# Patient Record
Sex: FEMALE | Race: BLACK OR AFRICAN AMERICAN | URBAN - METROPOLITAN AREA
[De-identification: names, ages, dates, MRNs, and addresses within clinical notes are randomized per-mention and may not be internally consistent; named-entity substitution may affect disease eponyms.]

---

## 2017-01-18 ENCOUNTER — OFFICE VISIT (OUTPATIENT)
Dept: INTERNAL MEDICINE CLINIC | Age: 52
End: 2017-01-18

## 2017-01-18 VITALS
TEMPERATURE: 99.1 F | HEIGHT: 64 IN | WEIGHT: 248 LBS | BODY MASS INDEX: 42.34 KG/M2 | OXYGEN SATURATION: 100 % | RESPIRATION RATE: 20 BRPM | SYSTOLIC BLOOD PRESSURE: 139 MMHG | DIASTOLIC BLOOD PRESSURE: 74 MMHG | HEART RATE: 86 BPM

## 2017-01-18 DIAGNOSIS — E66.01 MORBID OBESITY WITH BMI OF 40.0-44.9, ADULT (HCC): ICD-10-CM

## 2017-01-18 DIAGNOSIS — K29.00 ACUTE GASTRITIS WITHOUT HEMORRHAGE, UNSPECIFIED GASTRITIS TYPE: ICD-10-CM

## 2017-01-18 DIAGNOSIS — M54.12 CERVICAL RADICULOPATHY: ICD-10-CM

## 2017-01-18 DIAGNOSIS — E11.9 CONTROLLED TYPE 2 DIABETES MELLITUS WITHOUT COMPLICATION, WITHOUT LONG-TERM CURRENT USE OF INSULIN (HCC): Primary | ICD-10-CM

## 2017-01-18 RX ORDER — OMEPRAZOLE 40 MG/1
40 CAPSULE, DELAYED RELEASE ORAL DAILY
Qty: 30 CAP | Refills: 4 | Status: SHIPPED | OUTPATIENT
Start: 2017-01-18 | End: 2017-06-26 | Stop reason: SDUPTHER

## 2017-01-18 NOTE — MR AVS SNAPSHOT
Visit Information Date & Time Provider Department Dept. Phone Encounter #  
 1/18/2017 10:30 AM MD JOVITA Alcantara AND PRIMARY CARE - [place] Donato [phone] [id number] Follow-up Instructions Return in about 4 months (around 5/18/2017). Follow-up and Disposition History Your Appointments 5/17/2017 10:30 AM  
Any with MD JOVITA Alcantara AND PRIMARY CARE - [place] Donato ([facility]) Appt Note: 4 month f/u  
 [address], [address] [facility] 98  
  
   
 [address], [place] [address] [place] [address] Upcoming Health Maintenance Date Due  
 EYE EXAM RETINAL OR DILATED Q1 1/30/1973 HEMOGLOBIN A1C Q6M 4/12/2017 LIPID PANEL Q1 5/4/2017 FOBT Q 1 YEAR AGE 50-75 5/4/2017 FOOT EXAM Q1 1/18/2018 MICROALBUMIN Q1 1/18/2018 BREAST CANCER SCRN MAMMOGRAM 5/4/2018 PAP AKA CERVICAL CYTOLOGY 5/4/2019 DTaP/Tdap/Td series (2 - Td) 6/1/2026 Allergies as of 1/18/2017  Review Complete On: 1/18/2017 By: Netta Dave MD  
  
 Severity Noted Reaction Type Reactions Aspirin  05/04/2016    Other (comments) Bronchospasm Current Immunizations  Never Reviewed No immunizations on file. Not reviewed this visit You Were Diagnosed With   
  
 Codes Comments Controlled type 2 diabetes mellitus without complication, without long-term current use of insulin ([facility]    -  Primary ICD-10-CM: E11.9 ICD-9-CM: 250.00 Acute gastritis without hemorrhage, unspecified gastritis type     ICD-10-CM: K29.00 ICD-9-CM: 535.00 Morbid obesity with BMI of 40.0-44.9, adult (HCC)     ICD-10-CM: E66.01, Z68.41 
ICD-9-CM: 278.01, V85.41 Cervical radiculopathy     ICD-10-CM: M54.12 
ICD-9-CM: 723.4 Vitals BP Pulse Temp Resp Height(growth percentile) Weight(growth percentile)  139/74 (BP 1 Location: Right arm, BP Patient Position: Sitting) 86 99.1 °F (37.3 °C) (Oral) 20 5' 4\" (1.626 m) 248 lb (112.5 kg) SpO2 BMI Smoking Status 100% 42.57 kg/m2 Never Smoker BMI and BSA Data Body Mass Index Body Surface Area 42.57 kg/m 2 2.25 m 2 Preferred Pharmacy Pharmacy Name Phone [facility] #[id number]- [place], [address] Jessica Ohara [phone] Your Updated Medication List  
  
   
This list is accurate as of: 1/18/17 11:37 AM.  Always use your most recent med list.  
  
  
  
  
 ADVAIR DISKUS 100-50 mcg/dose diskus inhaler Generic drug:  fluticasone-salmeterol Take 1 Puff by inhalation two (2) times a day. naproxen 500 mg tablet Commonly known as:  NAPROSYN Take 1 Tab by mouth two (2) times daily (with meals). omeprazole 40 mg capsule Commonly known as:  PRILOSEC Take 1 Cap by mouth daily. PROVENTIL HFA 90 mcg/actuation inhaler Generic drug:  albuterol Take 2 Puffs by inhalation every four (4) hours as needed for Wheezing. valACYclovir 1 gram tablet Commonly known as:  VALTREX Prescriptions Sent to Pharmacy Refills  
 omeprazole (PRILOSEC) 40 mg capsule 4 Sig: Take 1 Cap by mouth daily. Class: Normal  
 Pharmacy: [facility]/pharmacy [address] [place] [address] #: [phone] Route: Oral  
  
We Performed the Following HEMOGLOBIN A1C WITH EAG [86847 CPT(R)] Follow-up Instructions Return in about 4 months (around 5/18/2017). Introducing [facility] & HEALTH SERVICES! Dear Dominic: Thank you for requesting a Eyetronics account. Our records indicate that you have previously registered for a Eyetronics account but its currently inactive. Please call our Eyetronics support line at [phone]. Additional Information If you have questions, please visit the Frequently Asked Questions section of the Eyetronics website at [email or web address] Remember, Orange Leapt is NOT to be used for urgent needs.  For medical emergencies, dial 911. Now available from your iPhone and Android! Please provide this summary of care documentation to your next provider. If you have any questions after today's visit, please call [phone].

## 2017-01-18 NOTE — PROGRESS NOTES
Chief Complaint   Patient presents with    Physical   .      SUBECTIVE:    Tyler Gomez is a 46 y.o. female comes in for return visit stating that she has had epigastric sensation that occurs episodically. There is no association with eating. It lasts for about five to ten minutes and abates. She realizes she is a diabetic based on my previous comments. Her last hemoglobin A1C was 6.4, a significant reduction from the initial one of 6.7. Her weight continues to decrease and I congratulate her on this. She is no longer having any pain in her cervical region. Current Outpatient Prescriptions   Medication Sig Dispense Refill    omeprazole (PRILOSEC) 40 mg capsule Take 1 Cap by mouth daily. 30 Cap 4    ADVAIR DISKUS 100-50 mcg/dose diskus inhaler Take 1 Puff by inhalation two (2) times a day. 1 Inhaler 11    PROVENTIL HFA 90 mcg/actuation inhaler Take 2 Puffs by inhalation every four (4) hours as needed for Wheezing. 1 Inhaler 11    valACYclovir (VALTREX) 1 gram tablet   11    naproxen (NAPROSYN) 500 mg tablet Take 1 Tab by mouth two (2) times daily (with meals).  61 Tab 11     Past Medical History   Diagnosis Date    Arthritis     Asthma      Past Surgical History   Procedure Laterality Date    Hx [de-identified]      Hx gyn       s/p myomectomy, HAY and BSO    Hx cholecystectomy      Hx colonoscopy  4-     tubulovillous adenoma---Dr.Christine Small     Allergies   Allergen Reactions    Aspirin Other (comments)     Bronchospasm       REVIEW OF SYSTEMS:  Review of Systems - Negative except   ENT ROS: negative for - headaches, hearing change, nasal congestion, oral lesions, tinnitus, visual changes or   Respiratory ROS: no cough, shortness of breath, or wheezing  Cardiovascular ROS: no chest pain or dyspnea on exertion  Gastrointestinal ROS: no abdominal pain, change in bowel habits, or black or blood  Genito-Urinary ROS: no dysuria, trouble voiding, or hematuria  Musculoskeletal ROS: negative  Neurological ROS: no TIA or stroke symptoms      Social History     Social History    Marital status: LEGALLY      Spouse name: N/A    Number of children: 0    Years of education: N/A     Occupational History    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      Social History Main Topics    Smoking status: Never Smoker    Smokeless tobacco: Never Used    Alcohol use No    Drug use: No    Sexual activity: Not Asked     Other Topics Concern    None     Social History Narrative   r  Family History   Problem Relation Age of Onset    Cancer Mother     Cancer Father      Prostate cancer    Diabetes Sister        OBJECTIVE:  Visit Vitals    /74 (BP 1 Location: Right arm, BP Patient Position: Sitting)    Pulse 86    Temp 99.1 °F (37.3 °C) (Oral)    Resp 20    Ht 5' 4\" (1.626 m)    Wt 248 lb (112.5 kg)    SpO2 100%    BMI 42.57 kg/m2     ENT: perrla,  eom intact  NECK: supple. Thyroid normal, no JVD  CHEST: clear to ascultation and percussion   HEART: regular rate and rhythm  ABD: soft, bowel sounds active,   EXTREMITIES: no edema, pulse 1+  INTEGUMENT: clear      ASSESSMENT:  1. Controlled type 2 diabetes mellitus without complication, without long-term current use of insulin ([address]    2. Acute gastritis without hemorrhage, unspecified gastritis type    3. Morbid obesity with BMI of 40.0-44.9, adult (HCC)    4. Cervical radiculopathy        PLAN:    1. From a diabetic perspective a hemoglobin A1C will be checked. I remind her that the most important thing that she can do from this perspective is to lose weight. 2. I suspect she might have an element of gastritis. She was quite tender in the epigastric region today. I will empirically start her on Omeprazole 40 mg every day . 3. I encourage her to lose weight by reducing carbohydrate intake. This means avoiding sweets, white bread, and white potatoes.    Her biggest problem is snacks. We talk about the implication of snacking and what she has to restrict. 4. Her cervical radiculopathy has completely resolved. .  Orders Placed This Encounter    HEMOGLOBIN A1C WITH EAG    omeprazole (PRILOSEC) 40 mg capsule       Follow-up Disposition:  Return in about 4 months (around 5/18/2017).       Konrad Trevino MD

## 2017-01-19 LAB
EST. AVERAGE GLUCOSE BLD GHB EST-MCNC: 134 MG/DL
HBA1C MFR BLD: 6.3 % (ref 4.8–5.6)

## 2017-05-17 ENCOUNTER — OFFICE VISIT (OUTPATIENT)
Dept: INTERNAL MEDICINE CLINIC | Age: 52
End: 2017-05-17

## 2017-05-17 VITALS
HEART RATE: 70 BPM | HEIGHT: 64 IN | SYSTOLIC BLOOD PRESSURE: 142 MMHG | TEMPERATURE: 97.4 F | OXYGEN SATURATION: 100 % | DIASTOLIC BLOOD PRESSURE: 70 MMHG | RESPIRATION RATE: 18 BRPM | WEIGHT: 252.6 LBS | BODY MASS INDEX: 43.13 KG/M2

## 2017-05-17 DIAGNOSIS — Z00.00 PHYSICAL EXAM: ICD-10-CM

## 2017-05-17 DIAGNOSIS — J45.20 REACTIVE AIRWAY DISEASE, MILD INTERMITTENT, UNCOMPLICATED: ICD-10-CM

## 2017-05-17 DIAGNOSIS — E11.9 CONTROLLED TYPE 2 DIABETES MELLITUS WITHOUT COMPLICATION, WITHOUT LONG-TERM CURRENT USE OF INSULIN (HCC): Primary | ICD-10-CM

## 2017-05-17 DIAGNOSIS — E66.01 MORBID OBESITY WITH BMI OF 40.0-44.9, ADULT (HCC): ICD-10-CM

## 2017-05-17 DIAGNOSIS — S93.401A SPRAIN OF RIGHT ANKLE, UNSPECIFIED LIGAMENT, INITIAL ENCOUNTER: ICD-10-CM

## 2017-05-17 PROBLEM — J45.909 REACTIVE AIRWAY DISEASE: Status: ACTIVE | Noted: 2017-05-17

## 2017-05-17 NOTE — PROGRESS NOTES
[address] [place] [address] and Primary Care  [place] [address]  [address] [place] [address]  Phone:  [phone]  Fax: [phone]       Chief Complaint   Patient presents with    Follow-up     4 month visit   . SUBJECTIVE:    Nereida Martinez is a 45 y.o. female  Dictation on: 05/17/2017  1:02 PM by: Torito Aleman [[id number]]          Current Outpatient Prescriptions   Medication Sig Dispense Refill    omeprazole (PRILOSEC) 40 mg capsule Take 1 Cap by mouth daily. 30 Cap 4    ADVAIR DISKUS 100-50 mcg/dose diskus inhaler Take 1 Puff by inhalation two (2) times a day. 1 Inhaler 11    PROVENTIL HFA 90 mcg/actuation inhaler Take 2 Puffs by inhalation every four (4) hours as needed for Wheezing. 1 Inhaler 11    valACYclovir (VALTREX) 1 gram tablet   11    naproxen (NAPROSYN) 500 mg tablet Take 1 Tab by mouth two (2) times daily (with meals).  61 Tab 11     Past Medical History:   Diagnosis Date    Arthritis     Asthma      Past Surgical History:   Procedure Laterality Date    HX CHOLECYSTECTOMY      HX COLONOSCOPY  4-    tubulovillous adenoma---Dr.Christine Small    HX GI      HX GYN      s/p myomectomy, HAY and BSO     Allergies   Allergen Reactions    Aspirin Other (comments)     Bronchospasm         REVIEW OF SYSTEMS:  General: negative for - chills or fever  ENT: negative for - headaches, nasal congestion or tinnitus  Respiratory: negative for - cough, hemoptysis, shortness of breath or wheezing  Cardiovascular : negative for - chest pain, edema, palpitations or shortness of breath  Gastrointestinal: negative for - abdominal pain, blood in stools, heartburn or nausea/vomiting  Genito-Urinary: no dysuria, trouble voiding, or hematuria  Musculoskeletal: negative for - gait disturbance, joint pain, joint stiffness or joint swelling  Neurological: no TIA or stroke symptoms  Hematologic: no bruises, no bleeding, no swollen glands  Integument: no lumps, mole changes, nail changes or rash  Endocrine: no malaise/lethargy or unexpected weight changes      Social History     Social History    Marital status: LEGALLY      Spouse name: N/A    Number of children: 0    Years of education: N/A     Occupational History    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      Social History Main Topics    Smoking status: Never Smoker    Smokeless tobacco: Never Used    Alcohol use No    Drug use: No    Sexual activity: Not Asked     Other Topics Concern    None     Social History Narrative     Family History   Problem Relation Age of Onset    Cancer Mother     Cancer Father      Prostate cancer    Diabetes Sister        OBJECTIVE:    Visit Vitals    /70 (BP 1 Location: Left arm, BP Patient Position: Sitting)    Pulse 70    Temp 97.4 °F (36.3 °C) (Oral)    Resp 18    Ht 5' 4\" (1.626 m)    Wt 252 lb 9.6 oz (114.6 kg)    SpO2 100%    BMI 43.36 kg/m2     CONSTITUTIONAL: well , well nourished, appears age appropriate  EYES: perrla, eom intact  ENMT:moist mucous membranes, pharynx clear  NECK: supple. Thyroid normal  RESPIRATORY: Chest: clear to ascultation and percussion   CARDIOVASCULAR: Heart: regular rate and rhythm  GASTROINTESTINAL: Abdomen: soft, bowel sounds active  HEMATOLOGIC: no pathological lymph nodes palpated  MUSCULOSKELETAL: Extremities: no edema, pulse 1+   INTEGUMENT: No unusual rashes or suspicious skin lesions noted. Nails appear normal.  NEUROLOGIC: non-focal exam   MENTAL STATUS: alert and oriented, appropriate affect      ASSESSMENT:  1. Controlled type 2 diabetes mellitus without complication, without long-term current use of insulin ([address]    2. Morbid obesity with BMI of 40.0-44.9, adult ([address]    3. Sprain of right ankle, unspecified ligament, initial encounter    4. Reactive airway disease, mild intermittent, uncomplicated    5. Physical exam        PLAN:    1. The patient's diabetes is hopefully doing well.   Emphasis is again placed on weight loss. 2. We talk as I stated earlier on ways to lose weight with emphasis placed on eating meals and minimizing snacking. Coupled with this was carbohydrate restriction by avoiding sweets, white bread, and white potatoes. .   3. She has a very mild sprain of her right ankle. Nothing more need be done. 4. Her reactive airway disease is stable today. .  Orders Placed This Encounter    APOLIPOPROTEIN B    CBC WITH AUTOMATED DIFF    LIPID PANEL    URINALYSIS W/ RFLX MICROSCOPIC    TSH 3RD GENERATION    METABOLIC PANEL, COMPREHENSIVE    HEMOGLOBIN A1C WITH EAG    MICROSCOPIC EXAMINATION         Follow-up Disposition:  Return in about 4 months (around 9/17/2017).       Sydnie Leary MD

## 2017-05-17 NOTE — MR AVS SNAPSHOT
Visit Information Date & Time Provider Department Dept. Phone Encounter #  
 5/17/2017 10:30 AM Sharyn Huerta MD SPORTS MED AND PRIMARY CARE - Maryln Osler [phone] [id number] Follow-up Instructions Return in about 4 months (around 9/17/2017). Your Appointments 9/20/2017 10:15 AM  
Any with Sharyn Huerta MD  
59 [facility] ([address]) Appt Note: 4 month f/u  
 [address] [place] [address] [place] [address]  
  
   
 [address] [facility] Upcoming Health Maintenance Date Due  
 EYE EXAM RETINAL OR DILATED Q1 1/30/1973 LIPID PANEL Q1 5/4/2017 FOBT Q 1 YEAR AGE 50-75 5/4/2017 HEMOGLOBIN A1C Q6M 7/18/2017 INFLUENZA AGE 9 TO ADULT 8/1/2017 FOOT EXAM Q1 1/18/2018 MICROALBUMIN Q1 1/18/2018 BREAST CANCER SCRN MAMMOGRAM 5/4/2018 PAP AKA CERVICAL CYTOLOGY 5/4/2019 DTaP/Tdap/Td series (2 - Td) 6/1/2026 Allergies as of 5/17/2017  Review Complete On: 5/17/2017 By: [place] Severity Noted Reaction Type Reactions Aspirin  05/04/2016    Other (comments) Bronchospasm Current Immunizations  Never Reviewed No immunizations on file. Not reviewed this visit You Were Diagnosed With   
  
 Codes Comments Controlled type 2 diabetes mellitus without complication, without long-term current use of insulin ([facility]    -  Primary ICD-10-CM: E11.9 ICD-9-CM: 250.00 Morbid obesity with BMI of 40.0-44.9, adult (HCC)     ICD-10-CM: E66.01, Z68.41 
ICD-9-CM: 278.01, V85.41 Physical exam     ICD-10-CM: Z00.00 ICD-9-CM: V70.9 Vitals BP Pulse Temp Resp Height(growth percentile) Weight(growth percentile) 142/70 (BP 1 Location: Left arm, BP Patient Position: Sitting) 70 97.4 °F (36.3 °C) (Oral) 18 5' 4\" (1.626 m) 252 lb 9.6 oz (114.6 kg) SpO2 BMI OB Status Smoking Status 100% 43.36 kg/m2 Menopause Never Smoker BMI and BSA Data Body Mass Index Body Surface Area  
 43.36 kg/m 2 2.27 m 2 Preferred Pharmacy Pharmacy Name Phone [id number]- [place], [address] Sandra Lick [phone] Your Updated Medication List  
  
   
This list is accurate as of: 5/17/17 12:57 PM.  Always use your most recent med list.  
  
  
  
  
 ADVAIR DISKUS 100-50 mcg/dose diskus inhaler Generic drug:  fluticasone-salmeterol Take 1 Puff by inhalation two (2) times a day. naproxen 500 mg tablet Commonly known as:  NAPROSYN Take 1 Tab by mouth two (2) times daily (with meals). omeprazole 40 mg capsule Commonly known as:  PRILOSEC Take 1 Cap by mouth daily. PROVENTIL HFA 90 mcg/actuation inhaler Generic drug:  albuterol Take 2 Puffs by inhalation every four (4) hours as needed for Wheezing. valACYclovir 1 gram tablet Commonly known as:  VALTREX We Performed the Following APOLIPOPROTEIN B [id number] CPT(R)] CBC WITH AUTOMATED DIFF [14361 CPT(R)] HEMOGLOBIN A1C WITH EAG [63736 CPT(R)] LIPID PANEL [25059 CPT(R)] METABOLIC PANEL, COMPREHENSIVE [24814 CPT(R)] [place] COLLECTION VENOUS BLOOD,VENIPUNCTURE [id number] CPT(R)] TSH 3RD GENERATION [41056 CPT(R)] URINALYSIS W/ RFLX MICROSCOPIC [44816 CPT(R)] Follow-up Instructions Return in about 4 months (around 9/17/2017). Introducing [facility] & HEALTH SERVICES! Dear Angie Stevens: Thank you for requesting a Dydra account. Our records indicate that you have previously registered for a Dydra account but its currently inactive. Please call our Dydra support line at [phone]. Additional Information If you have questions, please visit the Frequently Asked Questions section of the Dydra website at [email or web address] Remember, Dydra is NOT to be used for urgent needs. For medical emergencies, dial 911. Now available from your iPhone and Android! Please provide this summary of care documentation to your next provider. If you have any questions after today's visit, please call [phone].

## 2017-05-17 NOTE — PROGRESS NOTES
1. Have you been to the ER, urgent care clinic since your last visit? Hospitalized since your last visit? No    2. Have you seen or consulted any other health care providers outside of the [address] [place] [address] since your last visit? Include any pap smears or colon screening.  No

## 2017-05-18 LAB
ALBUMIN SERPL-MCNC: 4.5 G/DL (ref 3.5–5.5)
ALBUMIN/GLOB SERPL: 1.9 {RATIO} (ref 1.2–2.2)
ALP SERPL-CCNC: 69 IU/L (ref 39–117)
ALT SERPL-CCNC: 13 IU/L (ref 0–32)
APO B SERPL-MCNC: 85 MG/DL (ref 54–133)
APPEARANCE UR: CLEAR
AST SERPL-CCNC: 16 IU/L (ref 0–40)
BACTERIA #/AREA URNS HPF: NORMAL /[HPF]
BASOPHILS # BLD AUTO: 0 X10E3/UL (ref 0–0.2)
BASOPHILS NFR BLD AUTO: 0 %
BILIRUB SERPL-MCNC: 0.3 MG/DL (ref 0–1.2)
BILIRUB UR QL STRIP: NEGATIVE
BUN SERPL-MCNC: 10 MG/DL (ref 6–24)
BUN/CREAT SERPL: 13 (ref 9–23)
CALCIUM SERPL-MCNC: 10.5 MG/DL (ref 8.7–10.2)
CASTS URNS QL MICRO: NORMAL /LPF
CHLORIDE SERPL-SCNC: 100 MMOL/L (ref 96–106)
CHOLEST SERPL-MCNC: 181 MG/DL (ref 100–199)
CO2 SERPL-SCNC: 25 MMOL/L (ref 18–29)
COLOR UR: YELLOW
CREAT SERPL-MCNC: 0.79 MG/DL (ref 0.57–1)
EOSINOPHIL # BLD AUTO: 0.2 X10E3/UL (ref 0–0.4)
EOSINOPHIL NFR BLD AUTO: 4 %
EPI CELLS #/AREA URNS HPF: NORMAL /HPF
ERYTHROCYTE [DISTWIDTH] IN BLOOD BY AUTOMATED COUNT: 14.4 % (ref 12.3–15.4)
EST. AVERAGE GLUCOSE BLD GHB EST-MCNC: 134 MG/DL
GLOBULIN SER CALC-MCNC: 2.4 G/DL (ref 1.5–4.5)
GLUCOSE SERPL-MCNC: 94 MG/DL (ref 65–99)
GLUCOSE UR QL: NEGATIVE
HBA1C MFR BLD: 6.3 % (ref 4.8–5.6)
HCT VFR BLD AUTO: 38.1 % (ref 34–46.6)
HDLC SERPL-MCNC: 70 MG/DL
HGB BLD-MCNC: 12.2 G/DL (ref 11.1–15.9)
HGB UR QL STRIP: NEGATIVE
IMM GRANULOCYTES # BLD: 0 X10E3/UL (ref 0–0.1)
IMM GRANULOCYTES NFR BLD: 0 %
KETONES UR QL STRIP: NEGATIVE
LDLC SERPL CALC-MCNC: 99 MG/DL (ref 0–99)
LEUKOCYTE ESTERASE UR QL STRIP: ABNORMAL
LYMPHOCYTES # BLD AUTO: 2.7 X10E3/UL (ref 0.7–3.1)
LYMPHOCYTES NFR BLD AUTO: 48 %
MCH RBC QN AUTO: 26.5 PG (ref 26.6–33)
MCHC RBC AUTO-ENTMCNC: 32 G/DL (ref 31.5–35.7)
MCV RBC AUTO: 83 FL (ref 79–97)
MICRO URNS: ABNORMAL
MONOCYTES # BLD AUTO: 0.4 X10E3/UL (ref 0.1–0.9)
MONOCYTES NFR BLD AUTO: 7 %
NEUTROPHILS # BLD AUTO: 2.4 X10E3/UL (ref 1.4–7)
NEUTROPHILS NFR BLD AUTO: 41 %
NITRITE UR QL STRIP: NEGATIVE
PH UR STRIP: 8 [PH] (ref 5–7.5)
PLATELET # BLD AUTO: 256 X10E3/UL (ref 150–379)
POTASSIUM SERPL-SCNC: 4.5 MMOL/L (ref 3.5–5.2)
PROT SERPL-MCNC: 6.9 G/DL (ref 6–8.5)
PROT UR QL STRIP: NEGATIVE
RBC # BLD AUTO: 4.61 X10E6/UL (ref 3.77–5.28)
RBC #/AREA URNS HPF: NORMAL /HPF
SODIUM SERPL-SCNC: 140 MMOL/L (ref 134–144)
SP GR UR: 1.01 (ref 1–1.03)
TRIGL SERPL-MCNC: 59 MG/DL (ref 0–149)
TSH SERPL DL<=0.005 MIU/L-ACNC: 1.42 UIU/ML (ref 0.45–4.5)
UROBILINOGEN UR STRIP-MCNC: 0.2 MG/DL (ref 0.2–1)
VLDLC SERPL CALC-MCNC: 12 MG/DL (ref 5–40)
WBC # BLD AUTO: 5.8 X10E3/UL (ref 3.4–10.8)
WBC #/AREA URNS HPF: NORMAL /HPF

## 2017-10-02 ENCOUNTER — OFFICE VISIT (OUTPATIENT)
Dept: INTERNAL MEDICINE CLINIC | Age: 52
End: 2017-10-02

## 2017-10-02 VITALS
DIASTOLIC BLOOD PRESSURE: 85 MMHG | RESPIRATION RATE: 18 BRPM | HEIGHT: 64 IN | OXYGEN SATURATION: 100 % | HEART RATE: 72 BPM | TEMPERATURE: 97.9 F | BODY MASS INDEX: 43.19 KG/M2 | WEIGHT: 253 LBS | SYSTOLIC BLOOD PRESSURE: 154 MMHG

## 2017-10-02 DIAGNOSIS — E11.9 CONTROLLED TYPE 2 DIABETES MELLITUS WITHOUT COMPLICATION, WITHOUT LONG-TERM CURRENT USE OF INSULIN (HCC): Primary | ICD-10-CM

## 2017-10-02 DIAGNOSIS — J45.909 REACTIVE AIRWAY DISEASE WITHOUT COMPLICATION, UNSPECIFIED ASTHMA SEVERITY, UNSPECIFIED WHETHER PERSISTENT: ICD-10-CM

## 2017-10-02 DIAGNOSIS — A60.00 GENITAL HERPES SIMPLEX, UNSPECIFIED SITE: ICD-10-CM

## 2017-10-02 DIAGNOSIS — E66.01 MORBID OBESITY WITH BMI OF 40.0-44.9, ADULT (HCC): ICD-10-CM

## 2017-10-02 RX ORDER — VALACYCLOVIR HYDROCHLORIDE 1 G/1
500 TABLET, FILM COATED ORAL DAILY
Qty: 30 TAB | Refills: 11 | Status: SHIPPED | OUTPATIENT
Start: 2017-10-02 | End: 2017-10-06 | Stop reason: SDUPTHER

## 2017-10-02 RX ORDER — NAPROXEN 500 MG/1
500 TABLET ORAL 2 TIMES DAILY WITH MEALS
Qty: 60 TAB | Refills: 11 | Status: SHIPPED | OUTPATIENT
Start: 2017-10-02 | End: 2020-07-21 | Stop reason: SDUPTHER

## 2017-10-02 RX ORDER — ALBUTEROL SULFATE 90 UG/1
2 AEROSOL, METERED RESPIRATORY (INHALATION)
Qty: 1 INHALER | Refills: 11 | Status: SHIPPED | OUTPATIENT
Start: 2017-10-02 | End: 2019-01-14 | Stop reason: SDUPTHER

## 2017-10-02 NOTE — MR AVS SNAPSHOT
Visit Information Date & Time Provider Department Dept. Phone Encounter #  
 10/2/2017  3:45 PM Konrad Trevino MD SPORTS MED AND PRIMARY CARE - [place] [phone] [id number] Follow-up Instructions Return in about 4 months (around 2/2/2018). Upcoming Health Maintenance Date Due  
 EYE EXAM RETINAL OR DILATED Q1 1/30/1973 FOBT Q 1 YEAR AGE 50-75 5/4/2017 INFLUENZA AGE 9 TO ADULT 8/1/2017 HEMOGLOBIN A1C Q6M 11/17/2017 FOOT EXAM Q1 1/18/2018 MICROALBUMIN Q1 1/18/2018 BREAST CANCER SCRN MAMMOGRAM 5/4/2018 LIPID PANEL Q1 5/17/2018 PAP AKA CERVICAL CYTOLOGY 5/4/2019 DTaP/Tdap/Td series (2 - Td) 6/1/2026 Allergies as of 10/2/2017  Review Complete On: 10/2/2017 By: Onita Mess Severity Noted Reaction Type Reactions Aspirin  05/04/2016    Other (comments) Bronchospasm Current Immunizations  Never Reviewed No immunizations on file. Not reviewed this visit You Were Diagnosed With   
  
 Codes Comments Controlled type 2 diabetes mellitus without complication, without long-term current use of insulin ([facility]    -  Primary ICD-10-CM: E11.9 ICD-9-CM: 250.00 Morbid obesity with BMI of 40.0-44.9, adult (HCC)     ICD-10-CM: E66.01, Z68.41 
ICD-9-CM: 278.01, V85.41 Reactive airway disease without complication, unspecified asthma severity, unspecified whether persistent     ICD-10-CM: J45.909 ICD-9-CM: 493.90 Genital herpes simplex, unspecified site     ICD-10-CM: A60.00 ICD-9-CM: 054.10 Vitals BP Pulse Temp Resp Height(growth percentile) Weight(growth percentile) 154/85 (BP 1 Location: Left arm, BP Patient Position: Sitting) 72 97.9 °F (36.6 °C) (Oral) 18 5' 4\" (1.626 m) 253 lb (114.8 kg) SpO2 BMI OB Status Smoking Status 100% 43.43 kg/m2 Menopause Never Smoker BMI and BSA Data Body Mass Index Body Surface Area  
 43.43 kg/m 2 2.28 m 2 Preferred Pharmacy Pharmacy Name Phone [facility][id number]- [place], [address] Janeen Burton [phone] Your Updated Medication List  
  
   
This list is accurate as of: 10/2/17  5:19 PM.  Always use your most recent med list.  
  
  
  
  
 ADVAIR DISKUS 100-50 mcg/dose diskus inhaler Generic drug:  fluticasone-salmeterol TAKE 1 PUFF BY INHALATION TWO (2) TIMES A DAY. albuterol 90 mcg/actuation inhaler Commonly known as:  PROVENTIL HFA, VENTOLIN HFA, PROAIR HFA Take 2 Puffs by inhalation every four (4) hours as needed for Wheezing. naproxen 500 mg tablet Commonly known as:  NAPROSYN Take 1 Tab by mouth two (2) times daily (with meals). omeprazole 40 mg capsule Commonly known as:  PRILOSEC Take 1 Cap by mouth daily. valACYclovir 1 gram tablet Commonly known as:  VALTREX Take 0.5 Tabs by mouth daily. Prescriptions Sent to Pharmacy Refills  
 naproxen (NAPROSYN) 500 mg tablet 11 Sig: Take 1 Tab by mouth two (2) times daily (with meals). Class: Normal  
 Pharmacy: [address] Ph #: [phone] Route: Oral  
 valACYclovir (VALTREX) 1 gram tablet 11 Sig: Take 0.5 Tabs by mouth daily. Class: Normal  
 Pharmacy: [address] Ph #: [phone] Route: Oral  
 albuterol (PROVENTIL HFA, VENTOLIN HFA, PROAIR HFA) 90 mcg/actuation inhaler 11 Sig: Take 2 Puffs by inhalation every four (4) hours as needed for Wheezing. Class: Normal  
 Pharmacy: [address] Ph #: [phone] Route: Inhalation We Performed the Following HEMOGLOBIN A1C WITH EAG [29999 CPT(R)] Follow-up Instructions Return in about 4 months (around 2/2/2018). [facility] SERVICES! Dear Dominic: Thank you for requesting a Verafin account.   Our records indicate that you have previously registered for a Habbits account but its currently inactive. Please call our Habbits support line at [phone]. Additional Information If you have questions, please visit the Frequently Asked Questions section of the Habbits website at [email or web address] Remember, Habbits is NOT to be used for urgent needs. For medical emergencies, dial 911. Now available from your iPhone and Android! Please provide this summary of care documentation to your next provider. Your primary care clinician is listed as Uyen Arreaga. If you have any questions after today's visit, please call [phone].

## 2017-10-02 NOTE — PROGRESS NOTES
Chief Complaint   Patient presents with    Diabetes     FOLLOW UP     1. Have you been to the ER, urgent care clinic since your last visit? Hospitalized since your last visit? Yes When: 08/2017    2. Have you seen or consulted any other health care providers outside of the [address] [place] [address] since your last visit? Include any pap smears or colon screening.  Yes Reason for visit: BREATHING ISSUE

## 2017-10-03 LAB
EST. AVERAGE GLUCOSE BLD GHB EST-MCNC: 126 MG/DL
HBA1C MFR BLD: 6 % (ref 4.8–5.6)

## 2017-10-03 NOTE — PROGRESS NOTES
[address] [place] [address] and Primary Care  [place] [address]  [address] [address] Formerly [facility] 87 [address]  Phone:  [phone]  Fax: [phone]       Chief Complaint   Patient presents with    Diabetes     FOLLOW UP   .      SUBJECTIVE:    Isabel Van is a 45 y.o. female Comes in for return visit stating that she has done well. Unfortunately she has not lost any weight. This is very important because she does have existing diabetes. She formerly had impaired glucose tolerance, but actually is a form of diabetes based on hemoglobin A1c that was 6.7. Subsequent ones have been less than 6.5. She has a history of reactive airway disease, and needs a refill on her Albuterol. Her flares are sporadic. Finally, she does have a history of herpes simplex and has been taking Valacyclovir three times a day for depression. The dose is a bit high. Current Outpatient Prescriptions   Medication Sig Dispense Refill    naproxen (NAPROSYN) 500 mg tablet Take 1 Tab by mouth two (2) times daily (with meals). 60 Tab 11    valACYclovir (VALTREX) 1 gram tablet Take 0.5 Tabs by mouth daily. 30 Tab 11    albuterol (PROVENTIL HFA, VENTOLIN HFA, PROAIR HFA) 90 mcg/actuation inhaler Take 2 Puffs by inhalation every four (4) hours as needed for Wheezing. 1 Inhaler 11    ADVAIR DISKUS 100-50 mcg/dose diskus inhaler TAKE 1 PUFF BY INHALATION TWO (2) TIMES A DAY. 1 Inhaler 11    omeprazole (PRILOSEC) 40 mg capsule Take 1 Cap by mouth daily.  30 Cap 11     Past Medical History:   Diagnosis Date    Arthritis     Asthma      Past Surgical History:   Procedure Laterality Date    HX CHOLECYSTECTOMY      HX COLONOSCOPY  4-    tubulovillous adenoma---Dr.Christine Small    HX GI      HX GYN      s/p myomectomy, AHY and BSO     Allergies   Allergen Reactions    Aspirin Other (comments)     Bronchospasm         REVIEW OF SYSTEMS:  General: negative for - chills or fever  ENT: negative for - headaches, nasal congestion or tinnitus  Respiratory: negative for - cough, hemoptysis, shortness of breath or wheezing  Cardiovascular : negative for - chest pain, edema, palpitations or shortness of breath  Gastrointestinal: negative for - abdominal pain, blood in stools, heartburn or nausea/vomiting  Genito-Urinary: no dysuria, trouble voiding, or hematuria  Musculoskeletal: negative for - gait disturbance, joint pain, joint stiffness or joint swelling  Neurological: no TIA or stroke symptoms  Hematologic: no bruises, no bleeding, no swollen glands  Integument: no lumps, mole changes, nail changes or rash  Endocrine: no malaise/lethargy or unexpected weight changes      Social History     Social History    Marital status: LEGALLY      Spouse name: N/A    Number of children: 0    Years of education: N/A     Occupational History    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      Social History Main Topics    Smoking status: Never Smoker    Smokeless tobacco: Never Used    Alcohol use No    Drug use: No    Sexual activity: Not Asked     Other Topics Concern    None     Social History Narrative     Family History   Problem Relation Age of Onset    Cancer Mother     Cancer Father      Prostate cancer    Diabetes Sister        OBJECTIVE:    Visit Vitals    /85 (BP 1 Location: Left arm, BP Patient Position: Sitting)    Pulse 72    Temp 97.9 °F (36.6 °C) (Oral)    Resp 18    Ht 5' 4\" (1.626 m)    Wt 253 lb (114.8 kg)    SpO2 100%    BMI 43.43 kg/m2     CONSTITUTIONAL: well , well nourished, appears age appropriate  EYES: perrla, eom intact  ENMT:moist mucous membranes, pharynx clear  NECK: supple.  Thyroid normal  RESPIRATORY: Chest: clear to ascultation and percussion   CARDIOVASCULAR: Heart: regular rate and rhythm  GASTROINTESTINAL: Abdomen: soft, bowel sounds active  HEMATOLOGIC: no pathological lymph nodes palpated  MUSCULOSKELETAL: Extremities: no edema, pulse 1+ INTEGUMENT: No unusual rashes or suspicious skin lesions noted. Nails appear normal.  NEUROLOGIC: non-focal exam   MENTAL STATUS: alert and oriented, appropriate affect      ASSESSMENT:  1. Controlled type 2 diabetes mellitus without complication, without long-term current use of insulin ([address]    2. Morbid obesity with BMI of 40.0-44.9, adult ([address]    3. Reactive airway disease without complication, unspecified asthma severity, unspecified whether persistent    4. Genital herpes simplex, unspecified site        PLAN:    1. I remind patient that she has diabetes. The most important thing that can be done is weight loss. As long as she loses weight this will not be a problem. Dietary restriction is again emphasized. 2. As far as the obesity is concerned, which is as I discussed earlier, carbohydrate restriction is the main focus. More importantly, however, she had to eat meals, breakfast, lunch, and dinner. She is missing her dinner frequently and snacking for the remainder of the day. This has to stop. She is to avoid sweets, white bread, white potatoes. 3. She also has reactive airway disease, so we will be giving her a Ventolin inhaler. Her insurance company is not paying for . 4. For suppression, I will give her 1 gm of the Valtrex daily. Hopefully this should function the suppressive capacity adequately enough such that she should not have to take 3 gm daily. .  Orders Placed This Encounter    HEMOGLOBIN A1C WITH EAG    naproxen (NAPROSYN) 500 mg tablet    valACYclovir (VALTREX) 1 gram tablet    albuterol (PROVENTIL HFA, VENTOLIN HFA, PROAIR HFA) 90 mcg/actuation inhaler         Follow-up Disposition:  Return in about 4 months (around 2/2/2018).       Shanae Vallejo MD

## 2017-10-09 RX ORDER — VALACYCLOVIR HYDROCHLORIDE 1 G/1
500 TABLET, FILM COATED ORAL DAILY
Qty: 30 TAB | Refills: 11 | Status: SHIPPED | OUTPATIENT
Start: 2017-10-09

## 2017-12-12 ENCOUNTER — OFFICE VISIT (OUTPATIENT)
Dept: INTERNAL MEDICINE CLINIC | Age: 52
End: 2017-12-12

## 2017-12-12 VITALS
BODY MASS INDEX: 43.77 KG/M2 | SYSTOLIC BLOOD PRESSURE: 143 MMHG | HEIGHT: 64 IN | OXYGEN SATURATION: 98 % | HEART RATE: 82 BPM | WEIGHT: 256.4 LBS | TEMPERATURE: 97.7 F | DIASTOLIC BLOOD PRESSURE: 85 MMHG | RESPIRATION RATE: 16 BRPM

## 2017-12-12 DIAGNOSIS — E11.9 CONTROLLED TYPE 2 DIABETES MELLITUS WITHOUT COMPLICATION, WITHOUT LONG-TERM CURRENT USE OF INSULIN (HCC): ICD-10-CM

## 2017-12-12 DIAGNOSIS — E66.01 MORBID OBESITY WITH BMI OF 40.0-44.9, ADULT (HCC): ICD-10-CM

## 2017-12-12 DIAGNOSIS — N39.0 URINARY TRACT INFECTION WITHOUT HEMATURIA, SITE UNSPECIFIED: Primary | ICD-10-CM

## 2017-12-12 NOTE — PROGRESS NOTES
Chief Complaint   Patient presents with    Other     patient states that she would like to be checked for a bladder infection. she states that she has been having pelvic pain and her urine stream is slow. .      SUBECTIVE:    Dagoberto Austin is a 45 y.o. female comes in for a return visit concerned about the presence of a urinary tract infection. She has noted urgency with the passage of somewhat malodorous urine. This has been present for the last two weeks. She also notes circumferential pain in the lower pelvic area extending anteriorly. She has had no fever, chills or sweats. There has been no meaningful weight loss since I last saw her. She also has diet controlled diabetes. Current Outpatient Prescriptions   Medication Sig Dispense Refill    valACYclovir (VALTREX) 1 gram tablet Take 0.5 Tabs by mouth daily. 30 Tab 11    naproxen (NAPROSYN) 500 mg tablet Take 1 Tab by mouth two (2) times daily (with meals). 60 Tab 11    albuterol (PROVENTIL HFA, VENTOLIN HFA, PROAIR HFA) 90 mcg/actuation inhaler Take 2 Puffs by inhalation every four (4) hours as needed for Wheezing. 1 Inhaler 11    ADVAIR DISKUS 100-50 mcg/dose diskus inhaler TAKE 1 PUFF BY INHALATION TWO (2) TIMES A DAY. 1 Inhaler 11    omeprazole (PRILOSEC) 40 mg capsule Take 1 Cap by mouth daily.  30 Cap 11     Past Medical History:   Diagnosis Date    Arthritis     Asthma      Past Surgical History:   Procedure Laterality Date    HX CHOLECYSTECTOMY      HX COLONOSCOPY  4-    tubulovillous adenoma---Dr.Christine Small    HX GI      HX GYN      s/p myomectomy, HAY and BSO     Allergies   Allergen Reactions    Aspirin Other (comments)     Bronchospasm       REVIEW OF SYSTEMS:  Review of Systems - Negative except   ENT ROS: negative for - headaches, hearing change, nasal congestion, oral lesions, tinnitus, visual changes or   Respiratory ROS: no cough, shortness of breath, or wheezing  Cardiovascular ROS: no chest pain or dyspnea on exertion  Gastrointestinal ROS: no abdominal pain, change in bowel habits, or black or blood  Genito-Urinary ROS: no dysuria, trouble voiding, or hematuria  Musculoskeletal ROS: negative  Neurological ROS: no TIA or stroke symptoms      Social History     Social History    Marital status: LEGALLY      Spouse name: N/A    Number of children: 0    Years of education: N/A     Occupational History    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      Social History Main Topics    Smoking status: Never Smoker    Smokeless tobacco: Never Used    Alcohol use No    Drug use: No    Sexual activity: Not Asked     Other Topics Concern    None     Social History Narrative   r  Family History   Problem Relation Age of Onset    Cancer Mother     Cancer Father      Prostate cancer    Diabetes Sister        OBJECTIVE:  Visit Vitals    /85 (BP 1 Location: Right arm, BP Patient Position: Sitting)    Pulse 82    Temp 97.7 °F (36.5 °C) (Oral)    Resp 16    Ht 5' 4\" (1.626 m)    Wt 256 lb 6.4 oz (116.3 kg)    SpO2 98%    BMI 44.01 kg/m2     ENT: perrla,  eom intact  NECK: supple. Thyroid normal, no JVD  CHEST: clear to ascultation and percussion   HEART: regular rate and rhythm  ABD: soft, bowel sounds active, no JVD  EXTREMITIES: no edema, pulse 1+  INTEGUMENT: clear      ASSESSMENT:  1. Urinary tract infection without hematuria, site unspecified    2. Morbid obesity with BMI of 40.0-44.9, adult ([address]    3. Controlled type 2 diabetes mellitus without complication, without long-term current use of insulin ([address]        PLAN:    1. The patient may have a urinary tract infection. I will await the results of her urinalysis and urine culture. 2. I encouraged her to lose weight as I always do. This can be accomplished by eating meals and reducing snacking, as well as avoiding processed carbohydrates.               3. She will follow up with her diabetes on a return visit, but thus far, she has remained reasonably euglycemic. Dietary restrictions are again emphasized. .  Orders Placed This Encounter    CULTURE, URINE    URINALYSIS W/ RFLX MICROSCOPIC       Follow-up Disposition:  Return keep old apt.       Natalie Murillo MD

## 2017-12-12 NOTE — PROGRESS NOTES
Chief Complaint   Patient presents with    Other     patient states that she would like to be checked for a bladder infection. she states that she has been having pelvic pain and her urine stream is slow. 1. Have you been to the ER, urgent care clinic since your last visit? Hospitalized since your last visit? No    2. Have you seen or consulted any other health care providers outside of the [address] [place] [address] since your last visit? Include any pap smears or colon screening.  No

## 2017-12-12 NOTE — MR AVS SNAPSHOT
Visit Information Date & Time Provider Department Dept. Phone Encounter #  
 12/12/2017  1:00 PM Errol Armendariz [facility] [id number] Your Appointments 2/14/2018 10:15 AM  
Any with Sarai Carbajal MD  
[facility] ([address]) Appt Note: 4 month f/u dm  
 [address], [facility] 305 [facility] 98  
  
   
 [address], [place] [address] [place] [address] Upcoming Health Maintenance Date Due  
 EYE EXAM RETINAL OR DILATED Q1 1/30/1973 FOBT Q 1 YEAR AGE 50-75 5/4/2017 FOOT EXAM Q1 1/18/2018 MICROALBUMIN Q1 1/18/2018 HEMOGLOBIN A1C Q6M 4/2/2018 LIPID PANEL Q1 5/17/2018 PAP AKA CERVICAL CYTOLOGY 5/4/2019 DTaP/Tdap/Td series (2 - Td) 6/1/2026 Allergies as of 12/12/2017  Review Complete On: 12/12/2017 By: Eric Resendez Severity Noted Reaction Type Reactions Aspirin  05/04/2016    Other (comments) Bronchospasm Current Immunizations  Never Reviewed No immunizations on file. Not reviewed this visit You Were Diagnosed With   
  
 Codes Comments Urinary tract infection without hematuria, site unspecified    -  Primary ICD-10-CM: N39.0 ICD-9-CM: 599.0 Morbid obesity with BMI of 40.0-44.9, adult (HCC)     ICD-10-CM: E66.01, Z68.41 
ICD-9-CM: 278.01, V85.41 Controlled type 2 diabetes mellitus without complication, without long-term current use of insulin ([facility]     ICD-10-CM: E11.9 ICD-9-CM: 250.00 Vitals BP Pulse Temp Resp Height(growth percentile) Weight(growth percentile) 143/85 (BP 1 Location: Right arm, BP Patient Position: Sitting) 82 97.7 °F (36.5 °C) (Oral) 16 5' 4\" (1.626 m) 256 lb 6.4 oz (116.3 kg) SpO2 BMI OB Status Smoking Status 98% 44.01 kg/m2 Menopause Never Smoker Vitals History BMI and BSA Data Body Mass Index Body Surface Area  44.01 kg/m 2 2.29 m 2  
  
  
 Preferred Pharmacy Pharmacy Name Phone [id number], [address] [place] [phone] Your Updated Medication List  
  
   
This list is accurate as of: 12/12/17  1:36 PM.  Always use your most recent med list.  
  
  
  
  
 ADVAIR DISKUS 100-50 mcg/dose diskus inhaler Generic drug:  fluticasone-salmeterol TAKE 1 PUFF BY INHALATION TWO (2) TIMES A DAY. albuterol 90 mcg/actuation inhaler Commonly known as:  PROVENTIL HFA, VENTOLIN HFA, PROAIR HFA Take 2 Puffs by inhalation every four (4) hours as needed for Wheezing. naproxen 500 mg tablet Commonly known as:  NAPROSYN Take 1 Tab by mouth two (2) times daily (with meals). omeprazole 40 mg capsule Commonly known as:  PRILOSEC Take 1 Cap by mouth daily. valACYclovir 1 gram tablet Commonly known as:  VALTREX Take 0.5 Tabs by mouth daily. We Performed the Following CULTURE, URINE [id number] CPT(R)] URINALYSIS W/ RFLX MICROSCOPIC [69670 CPT(R)] Introducing [facility] & [facility]! Dear Dominic: Thank you for requesting a [employer] account. Our records indicate that you have previously registered for a [employer] account but its currently inactive. Please call our [employer] support line at [phone]. Additional Information If you have questions, please visit the Frequently Asked Questions section of the [employer] website at [email or web address] Remember, [employer] is NOT to be used for urgent needs. For medical emergencies, dial 911. Now available from your iPhone and Android! Please provide this summary of care documentation to your next provider. Your primary care clinician is listed as Uyen Arreaga. If you have any questions after today's visit, please call [phone].

## 2017-12-13 LAB
APPEARANCE UR: CLEAR
BACTERIA #/AREA URNS HPF: ABNORMAL /[HPF]
BILIRUB UR QL STRIP: NEGATIVE
CASTS URNS QL MICRO: ABNORMAL /LPF
COLOR UR: YELLOW
EPI CELLS #/AREA URNS HPF: ABNORMAL /HPF
GLUCOSE UR QL: NEGATIVE
HGB UR QL STRIP: NEGATIVE
KETONES UR QL STRIP: NEGATIVE
LEUKOCYTE ESTERASE UR QL STRIP: ABNORMAL
MICRO URNS: ABNORMAL
MUCOUS THREADS URNS QL MICRO: PRESENT
NITRITE UR QL STRIP: NEGATIVE
PH UR STRIP: 7 [PH] (ref 5–7.5)
PROT UR QL STRIP: NEGATIVE
RBC #/AREA URNS HPF: ABNORMAL /HPF
SP GR UR: 1.01 (ref 1–1.03)
UROBILINOGEN UR STRIP-MCNC: 0.2 MG/DL (ref 0.2–1)
WBC #/AREA URNS HPF: ABNORMAL /HPF

## 2017-12-14 LAB — BACTERIA UR CULT: ABNORMAL

## 2018-02-14 ENCOUNTER — OFFICE VISIT (OUTPATIENT)
Dept: INTERNAL MEDICINE CLINIC | Age: 53
End: 2018-02-14

## 2018-02-14 VITALS
SYSTOLIC BLOOD PRESSURE: 130 MMHG | HEIGHT: 64 IN | DIASTOLIC BLOOD PRESSURE: 62 MMHG | BODY MASS INDEX: 43.19 KG/M2 | WEIGHT: 253 LBS | TEMPERATURE: 96 F | OXYGEN SATURATION: 100 % | RESPIRATION RATE: 18 BRPM | HEART RATE: 65 BPM

## 2018-02-14 DIAGNOSIS — J06.9 UPPER RESPIRATORY TRACT INFECTION, UNSPECIFIED TYPE: Primary | ICD-10-CM

## 2018-02-14 DIAGNOSIS — G47.33 OBSTRUCTIVE SLEEP APNEA: ICD-10-CM

## 2018-02-14 DIAGNOSIS — E11.9 CONTROLLED TYPE 2 DIABETES MELLITUS WITHOUT COMPLICATION, WITHOUT LONG-TERM CURRENT USE OF INSULIN (HCC): ICD-10-CM

## 2018-02-14 DIAGNOSIS — E66.01 MORBID OBESITY WITH BMI OF 40.0-44.9, ADULT (HCC): ICD-10-CM

## 2018-02-14 DIAGNOSIS — J45.909 REACTIVE AIRWAY DISEASE WITHOUT COMPLICATION, UNSPECIFIED ASTHMA SEVERITY, UNSPECIFIED WHETHER PERSISTENT: ICD-10-CM

## 2018-02-14 NOTE — MR AVS SNAPSHOT
[address] , [place] [address] 
[phone] Patient: Ariel Cunningham MRN: [id number] QPU:[id number] Visit Information Date & Time Provider Department Dept. Phone Encounter #  
 2/14/2018 10:15 AM Kamryn Fagan MD SPORTS MED AND PRIMARY CARE - [facility] [phone] [id number] Follow-up Instructions Return in about 4 months (around 6/14/2018). Upcoming Health Maintenance Date Due  
 EYE EXAM RETINAL OR DILATED Q1 8/14/2018* FOBT Q 1 YEAR AGE 50-75 8/14/2018* HEMOGLOBIN A1C Q6M 4/2/2018 LIPID PANEL Q1 5/17/2018 FOOT EXAM Q1 2/14/2019 MICROALBUMIN Q1 2/14/2019 PAP AKA CERVICAL CYTOLOGY 5/4/2019 BREAST CANCER SCRN MAMMOGRAM 2/14/2020 DTaP/Tdap/Td series (2 - Td) 6/1/2026 *Topic was postponed. The date shown is not the original due date. Allergies as of 2/14/2018  Review Complete On: 2/14/2018 By: Roseline Hansen Severity Noted Reaction Type Reactions Aspirin  05/04/2016    Other (comments) Bronchospasm Current Immunizations  Never Reviewed No immunizations on file. Not reviewed this visit You Were Diagnosed With   
  
 Codes Comments Upper respiratory tract infection, unspecified type    -  Primary ICD-10-CM: J06.9 ICD-9-CM: 465.9 Controlled type 2 diabetes mellitus without complication, without long-term current use of insulin ([facility]     ICD-10-CM: E11.9 ICD-9-CM: 250.00 Morbid obesity with BMI of 40.0-44.9, adult (HCC)     ICD-10-CM: E66.01, Z68.41 
ICD-9-CM: 278.01, V85.41 Reactive airway disease without complication, unspecified asthma severity, unspecified whether persistent     ICD-10-CM: J45.909 ICD-9-CM: 493.90 Obstructive sleep apnea     ICD-10-CM: G47.33 
ICD-9-CM: 327.23 Vitals BP Pulse Temp Resp Height(growth percentile) Weight(growth percentile)  130/62 (BP 1 Location: Right arm, BP Patient Position: Sitting) 65 96 °F (35.6 °C) (Oral) 18 5' 4\" (1.626 m) 253 lb (114.8 kg) SpO2 BMI OB Status Smoking Status 100% 43.43 kg/m2 Menopause Never Smoker BMI and BSA Data Body Mass Index Body Surface Area  
 43.43 kg/m 2 2.28 m 2 Preferred Pharmacy Pharmacy Name Phone [id number] [address], [address] [place] [phone] Your Updated Medication List  
  
   
This list is accurate as of: 2/14/18 11:16 AM.  Always use your most recent med list.  
  
  
  
  
 ADVAIR DISKUS 100-50 mcg/dose diskus inhaler Generic drug:  fluticasone-salmeterol TAKE 1 PUFF BY INHALATION TWO (2) TIMES A DAY. albuterol 90 mcg/actuation inhaler Commonly known as:  PROVENTIL HFA, VENTOLIN HFA, PROAIR HFA Take 2 Puffs by inhalation every four (4) hours as needed for Wheezing. naproxen 500 mg tablet Commonly known as:  NAPROSYN Take 1 Tab by mouth two (2) times daily (with meals). omeprazole 40 mg capsule Commonly known as:  PRILOSEC Take 1 Cap by mouth daily. valACYclovir 1 gram tablet Commonly known as:  VALTREX Take 0.5 Tabs by mouth daily. We Performed the Following HEMOGLOBIN A1C WITH EAG [64778 CPT(R)]  DIABETES FOOT EXAM [7 Custom] MICROALB/CREAT RATIO, TIMED UR [id number] CPT(R)] Follow-up Instructions Return in about 4 months (around 6/14/2018). Introducing [facility]! Dear Elvin Moseley: Thank you for requesting a "[employer] account. Our records indicate that you have previously registered for a "[employer] account but its currently inactive. Please call our "[employer] support line at [phone]. Additional Information If you have questions, please visit the Frequently Asked Questions section of the "[employer] website at [email or web address] Remember, "[employer] is NOT to be used for urgent needs. For medical emergencies, dial 911. Now available from your iPhone and Android! Please provide this summary of care documentation to your next provider. Your primary care clinician is listed as Uyen Arreaga. If you have any questions after today's visit, please call [phone].

## 2018-02-14 NOTE — PROGRESS NOTES
[address] [place] [address] and Primary Care  [place] [address]  [address] [place] [address]  Phone:  [phone]  Fax: [phone]       Chief Complaint   Patient presents with    Cold Symptoms   . SUBJECTIVE:    Nieves Mckeon is a 52 y.o. female comes in for return visit stating that she has done fairly well other than developing upper respiratory symptoms about a month ago. This improved but she has had a slight recurrence in the last several days consisting of slight nasal congestion and sort of a scratchy throat. There has been no meaningful weight loss since I last saw her. She does have a history of diabetes which is based purely on her hemoglobin A1c being greater than 6.5. She continues to have a sedentary lifestyle. Finally, she does have a history of obstructive sleep apnea and uses her device in a regular basis, specifically her CPAP. Current Outpatient Prescriptions   Medication Sig Dispense Refill    valACYclovir (VALTREX) 1 gram tablet Take 0.5 Tabs by mouth daily. 30 Tab 11    naproxen (NAPROSYN) 500 mg tablet Take 1 Tab by mouth two (2) times daily (with meals). 60 Tab 11    albuterol (PROVENTIL HFA, VENTOLIN HFA, PROAIR HFA) 90 mcg/actuation inhaler Take 2 Puffs by inhalation every four (4) hours as needed for Wheezing. 1 Inhaler 11    ADVAIR DISKUS 100-50 mcg/dose diskus inhaler TAKE 1 PUFF BY INHALATION TWO (2) TIMES A DAY. 1 Inhaler 11    omeprazole (PRILOSEC) 40 mg capsule Take 1 Cap by mouth daily.  30 Cap 11     Past Medical History:   Diagnosis Date    Arthritis     Asthma      Past Surgical History:   Procedure Laterality Date    HX CHOLECYSTECTOMY      HX COLONOSCOPY  4-    tubulovillous adenoma---Dr.Christine Small    HX GI      HX GYN      s/p myomectomy, HAY and BSO     Allergies   Allergen Reactions    Aspirin Other (comments)     Bronchospasm         REVIEW OF SYSTEMS:  General: negative for - chills or fever  ENT: negative for - headaches, nasal congestion or tinnitus  Respiratory: See HPI  Cardiovascular : negative for - chest pain, edema, palpitations or shortness of breath  Gastrointestinal: negative for - abdominal pain, blood in stools, heartburn or nausea/vomiting  Genito-Urinary: no dysuria, trouble voiding, or hematuria  Musculoskeletal: negative for - gait disturbance, joint pain, joint stiffness or joint swelling  Neurological: no TIA or stroke symptoms  Hematologic: no bruises, no bleeding, no swollen glands  Integument: no lumps, mole changes, nail changes or rash  Endocrine: no malaise/lethargy or unexpected weight changes      Social History     Social History    Marital status: LEGALLY      Spouse name: N/A    Number of children: 0    Years of education: N/A     Occupational History    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      Social History Main Topics    Smoking status: Never Smoker    Smokeless tobacco: Never Used    Alcohol use No    Drug use: No    Sexual activity: Not Asked     Other Topics Concern    None     Social History Narrative     Family History   Problem Relation Age of Onset    Cancer Mother     Cancer Father      Prostate cancer    Diabetes Sister        OBJECTIVE:    Visit Vitals    /62 (BP 1 Location: Right arm, BP Patient Position: Sitting)    Pulse 65    Temp 96 °F (35.6 °C) (Oral)    Resp 18    Ht 5' 4\" (1.626 m)    Wt 253 lb (114.8 kg)    SpO2 100%    BMI 43.43 kg/m2     CONSTITUTIONAL: well , well nourished, appears age appropriate  EYES: perrla, eom intact  ENMT:moist mucous membranes, pharynx clear  NECK: supple.  Thyroid normal  RESPIRATORY: Chest: Bilateral fine expiratory rhonchi  CARDIOVASCULAR: Heart: regular rate and rhythm  GASTROINTESTINAL: Abdomen: soft, bowel sounds active  HEMATOLOGIC: no pathological lymph nodes palpated  MUSCULOSKELETAL: Extremities: no edema, pulse 1+   INTEGUMENT: No unusual rashes or suspicious skin lesions noted. Nails appear normal.  NEUROLOGIC: non-focal exam   MENTAL STATUS: alert and oriented, appropriate affect      ASSESSMENT:  1. Upper respiratory tract infection, unspecified type    2. Reactive airway disease without complication, unspecified asthma severity, unspecified whether persistent    3. Controlled type 2 diabetes mellitus without complication, without long-term current use of insulin ([facility]    4. Morbid obesity with BMI of 40.0-44.9, adult ([facility]    5. Obstructive sleep apnea      Diagnoses and all orders for this visit:    1. Upper respiratory tract infection, unspecified type    2. Reactive airway disease without complication, unspecified asthma severity, unspecified whether persistent    3. Controlled type 2 diabetes mellitus without complication, without long-term current use of insulin ([facility])  Assessment & Plan:  Stable, based on history, physical exam and review of pertinent labs, studies and medications; meds reconciled; lifestyle modifications recommended. Key Antihyperglycemic Medications     Patient is on no antihyperglycemic meds. Other Key Diabetic Medications     Patient is on no other key diabetic meds. Lab Results   Component Value Date/Time    Hemoglobin A1c 6.0 02/14/2018 11:08 AM    Glucose 94 05/17/2017 12:59 PM    Creatinine 0.79 05/17/2017 12:59 PM    Cholesterol, total 181 05/17/2017 12:59 PM    HDL Cholesterol 70 05/17/2017 12:59 PM    LDL, calculated 99 05/17/2017 12:59 PM    Triglyceride 59 05/17/2017 12:59 PM     Diabetic Foot and Eye Exam  Status   Topic Date Due    Eye Exam  08/14/2018 (Originally 1/30/1975)    Diabetic Foot Care  02/14/2019       Orders:  -     MICROALB/CREAT RATIO, TIMED UR  -      DIABETES FOOT EXAM  -     HEMOGLOBIN A1C WITH EAG    4.  Morbid obesity with BMI of 40.0-44.9, adult [facility])  Assessment & Plan:  Uncontrolled, based on history, physical exam and review of pertinent labs, studies and medications; meds reconciled; lifestyle modifications recommended. Key Obesity Meds     Patient is on no anti-obesity meds. Lab Results   Component Value Date/Time    Hemoglobin A1c 6.0 02/14/2018 11:08 AM    Glucose 94 05/17/2017 12:59 PM    Cholesterol, total 181 05/17/2017 12:59 PM    HDL Cholesterol 70 05/17/2017 12:59 PM    LDL, calculated 99 05/17/2017 12:59 PM    Triglyceride 59 05/17/2017 12:59 PM    TSH 1.420 05/17/2017 12:59 PM    Sodium 140 05/17/2017 12:59 PM    Potassium 4.5 05/17/2017 12:59 PM    ALT (SGPT) 13 05/17/2017 12:59 PM    AST (SGOT) 16 05/17/2017 12:59 PM             5. Obstructive sleep apnea    Other orders  -     SPECIMEN STATUS REPORT        PLAN:    1. The patient has an upper respiratory infection complicated by mild reactive airway disease. This is actually better today. I question whether or not she does have an infection at this point. We will know in the next several days. In the intervening time, continue symptomatic treatment. 2. She is a diabetic and a hemoglobin A1c will be checked. Emphasis is again placed on weight reduction. 3. In order to lose weight, the patient will have to eat meals, reduce her snacking and avoid sweets, white breads and white potatoes. 4. She will also continue her CPAP usage. She is told that if she loses weight, her obstructive sleep apnea would indeed improve. .  Orders Placed This Encounter    MICROALB/CREAT RATIO, TIMED UR    HEMOGLOBIN A1C WITH EAG    SPECIMEN STATUS REPORT         Follow-up Disposition:  Return in about 4 months (around 6/14/2018).       Tisha Xiao MD

## 2018-02-15 LAB
COLLECT DURATION TIME UR: NORMAL MIN
CREAT UR-MCNC: NORMAL MG/DL
EST. AVERAGE GLUCOSE BLD GHB EST-MCNC: 126 MG/DL
HBA1C MFR BLD: 6 % (ref 4.8–5.6)
MICROALBUMIN UR-MCNC: NORMAL
SPECIMEN STATUS REPORT, ROLRST: NORMAL

## 2018-02-19 NOTE — ASSESSMENT & PLAN NOTE
Uncontrolled, based on history, physical exam and review of pertinent labs, studies and medications; meds reconciled; lifestyle modifications recommended. Key Obesity Meds     Patient is on no anti-obesity meds.         Lab Results   Component Value Date/Time    Hemoglobin A1c 6.0 02/14/2018 11:08 AM    Glucose 94 05/17/2017 12:59 PM    Cholesterol, total 181 05/17/2017 12:59 PM    HDL Cholesterol 70 05/17/2017 12:59 PM    LDL, calculated 99 05/17/2017 12:59 PM    Triglyceride 59 05/17/2017 12:59 PM    TSH 1.420 05/17/2017 12:59 PM    Sodium 140 05/17/2017 12:59 PM    Potassium 4.5 05/17/2017 12:59 PM    ALT (SGPT) 13 05/17/2017 12:59 PM    AST (SGOT) 16 05/17/2017 12:59 PM

## 2018-02-19 NOTE — ASSESSMENT & PLAN NOTE
Stable, based on history, physical exam and review of pertinent labs, studies and medications; meds reconciled; lifestyle modifications recommended. Key Antihyperglycemic Medications     Patient is on no antihyperglycemic meds. Other Key Diabetic Medications     Patient is on no other key diabetic meds.         Lab Results   Component Value Date/Time    Hemoglobin A1c 6.0 02/14/2018 11:08 AM    Glucose 94 05/17/2017 12:59 PM    Creatinine 0.79 05/17/2017 12:59 PM    Cholesterol, total 181 05/17/2017 12:59 PM    HDL Cholesterol 70 05/17/2017 12:59 PM    LDL, calculated 99 05/17/2017 12:59 PM    Triglyceride 59 05/17/2017 12:59 PM     Diabetic Foot and Eye Exam HM Status   Topic Date Due    Eye Exam  08/14/2018 (Originally 1/30/1975)   [address] [place] [address] Diabetic Foot Care  02/14/2019

## 2018-05-14 ENCOUNTER — OFFICE VISIT (OUTPATIENT)
Dept: INTERNAL MEDICINE CLINIC | Age: 53
End: 2018-05-14

## 2018-05-14 VITALS
TEMPERATURE: 98.3 F | RESPIRATION RATE: 18 BRPM | OXYGEN SATURATION: 97 % | WEIGHT: 254.1 LBS | HEART RATE: 78 BPM | HEIGHT: 64 IN | DIASTOLIC BLOOD PRESSURE: 82 MMHG | SYSTOLIC BLOOD PRESSURE: 126 MMHG | BODY MASS INDEX: 43.38 KG/M2

## 2018-05-14 DIAGNOSIS — E78.5 DYSLIPIDEMIA: ICD-10-CM

## 2018-05-14 DIAGNOSIS — E11.9 CONTROLLED TYPE 2 DIABETES MELLITUS WITHOUT COMPLICATION, WITHOUT LONG-TERM CURRENT USE OF INSULIN (HCC): ICD-10-CM

## 2018-05-14 DIAGNOSIS — J06.9 UPPER RESPIRATORY TRACT INFECTION, UNSPECIFIED TYPE: Primary | ICD-10-CM

## 2018-05-14 DIAGNOSIS — E66.01 MORBID OBESITY WITH BMI OF 40.0-44.9, ADULT (HCC): ICD-10-CM

## 2018-05-14 DIAGNOSIS — J45.20 MILD INTERMITTENT ASTHMA WITHOUT COMPLICATION: ICD-10-CM

## 2018-05-14 RX ORDER — PREDNISONE 20 MG/1
20 TABLET ORAL
Qty: 10 TAB | Refills: 0 | Status: SHIPPED | OUTPATIENT
Start: 2018-05-14 | End: 2018-09-07 | Stop reason: ALTCHOICE

## 2018-05-14 NOTE — PROGRESS NOTES
[address] [place] [address] and Primary Care  [place] [address]  [address]  [facility] [address]  Phone:  [phone]  Fax: [phone]       Chief Complaint   Patient presents with    Sore Throat     Patient states that she has been \"feeling bad\" x 1 week with congestion, running nose, \"scratchy\" throat, and reddish mucus. .      SUBJECTIVE:    Rob Gruber is a 52 y.o. female She comes in for a return visit complaining of upper respiratory symptoms starting last Tuesday. She is now left with a persistent hacky cough with audible wheezing, which is worse nocturnally. Last night was a better night than usual.  She is indeed using her inhaler on a regular basis, which is Advair one puff b.i.d. and her rescue inhaler as needed. There has been no meaningful weight loss. She does indeed have diabetes, which is diet controlled. Current Outpatient Prescriptions   Medication Sig Dispense Refill    predniSONE (DELTASONE) 20 mg tablet Take 1 Tab by mouth two (2) times daily (after meals). 10 Tab 0    valACYclovir (VALTREX) 1 gram tablet Take 0.5 Tabs by mouth daily. 30 Tab 11    naproxen (NAPROSYN) 500 mg tablet Take 1 Tab by mouth two (2) times daily (with meals). 60 Tab 11    albuterol (PROVENTIL HFA, VENTOLIN HFA, PROAIR HFA) 90 mcg/actuation inhaler Take 2 Puffs by inhalation every four (4) hours as needed for Wheezing. 1 Inhaler 11    ADVAIR DISKUS 100-50 mcg/dose diskus inhaler TAKE 1 PUFF BY INHALATION TWO (2) TIMES A DAY. 1 Inhaler 11    omeprazole (PRILOSEC) 40 mg capsule Take 1 Cap by mouth daily. 30 Cap 11    ciprofloxacin HCl (CIPRO) 500 mg tablet Take 1 Tab by mouth two (2) times a day for 10 days.  10 Tab 0     Past Medical History:   Diagnosis Date    Arthritis     Asthma      Past Surgical History:   Procedure Laterality Date    HX CHOLECYSTECTOMY      HX COLONOSCOPY  4-    tubulovillous adenoma---Dr.Christine Small    HX GI      HX GYN      s/p myomectomy, HAY and BSO     Allergies   Allergen Reactions    Aspirin Other (comments)     Bronchospasm         REVIEW OF SYSTEMS:  General: negative for - chills or fever  ENT: negative for - headaches, nasal congestion or tinnitus  Respiratory: negative for - cough, hemoptysis, shortness of breath or wheezing  Cardiovascular : negative for - chest pain, edema, palpitations or shortness of breath  Gastrointestinal: negative for - abdominal pain, blood in stools, heartburn or nausea/vomiting  Genito-Urinary: no dysuria, trouble voiding, or hematuria  Musculoskeletal: negative for - gait disturbance, joint pain, joint stiffness or joint swelling  Neurological: no TIA or stroke symptoms  Hematologic: no bruises, no bleeding, no swollen glands  Integument: no lumps, mole changes, nail changes or rash  Endocrine: no malaise/lethargy or unexpected weight changes      Social History     Social History    Marital status: LEGALLY      Spouse name: N/A    Number of children: 0    Years of education: N/A     Occupational History    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      Social History Main Topics    Smoking status: Never Smoker    Smokeless tobacco: Never Used    Alcohol use No    Drug use: No    Sexual activity: Not Asked     Other Topics Concern    None     Social History Narrative     Family History   Problem Relation Age of Onset    Cancer Mother     Cancer Father      Prostate cancer    Diabetes Sister        OBJECTIVE:    Visit Vitals    /82    Pulse 78    Temp 98.3 °F (36.8 °C) (Oral)    Resp 18    Ht 5' 4\" (1.626 m)    Wt 254 lb 1.6 oz (115.3 kg)    SpO2 97%    BMI 43.62 kg/m2     CONSTITUTIONAL: well , well nourished, appears age appropriate  EYES: perrla, eom intact  ENMT:moist mucous membranes, pharynx clear  NECK: supple.  Thyroid normal  RESPIRATORY: Chest: clear to ascultation and percussion   CARDIOVASCULAR: Heart: regular rate and rhythm  GASTROINTESTINAL: Abdomen: soft, bowel sounds active  HEMATOLOGIC: no pathological lymph nodes palpated  MUSCULOSKELETAL: Extremities: no edema, pulse 1+   INTEGUMENT: No unusual rashes or suspicious skin lesions noted. Nails appear normal.  NEUROLOGIC: non-focal exam   MENTAL STATUS: alert and oriented, appropriate affect      ASSESSMENT:  1. Upper respiratory tract infection, unspecified type    2. Mild intermittent asthma without complication    3. Controlled type 2 diabetes mellitus without complication, without long-term current use of insulin ([facility]    4. Morbid obesity with BMI of 40.0-44.9, adult ([facility]    5. Dyslipidemia        PLAN:    1. She has an upper respiratory infection complicated by reactive airway disease flare-up. She will use her inhalers as prescribed. I will give her a short course of medium dose Prednisone at 20 mg b.i.d. for the next five days. 2. While she is using the Prednisone, she will monitor her sugars during this time. 3. She also has herpes simplex for which she takes Valacyclovir. I suggest increasing the dose to twice a day while she is taking the Prednisone. 4. I encourage her to lose weight. 5. Appropriate lab work will be drawn today. 6. She was also given a letter regarding her air conditioning. .  Orders Placed This Encounter    APOLIPOPROTEIN B    CBC WITH AUTOMATED DIFF    LIPID PANEL    METABOLIC PANEL, COMPREHENSIVE    TSH 3RD GENERATION    URINALYSIS W/ RFLX MICROSCOPIC    HEMOGLOBIN A1C WITH EAG    MICROSCOPIC EXAMINATION    predniSONE (DELTASONE) 20 mg tablet         Follow-up Disposition:  Return in about 4 months (around 9/14/2018).       Zheng Su MD

## 2018-05-14 NOTE — LETTER
5/14/2018 10:27 AM 
 
Ms. Mirela Palm [address] [place] [phone] The above named patient has asthma. As a result she definitely needs a functional air-conditioner. This helps her disease process and minimize his hospitalization. Sincerely, Vinh Reynoso MD

## 2018-05-14 NOTE — PROGRESS NOTES
Chief Complaint   Patient presents with    Sore Throat     Patient states that she has been \"feeling bad\" x 1 week with congestion, running nose, \"scratchy\" throat, and reddish mucus. 1. Have you been to the ER, urgent care clinic since your last visit? Hospitalized since your last visit? No    2. Have you seen or consulted any other health care providers outside of the [facility] since your last visit? Include any pap smears or colon screening.  No

## 2018-05-14 NOTE — MR AVS SNAPSHOT
[address] [place] [address]. [place] [address] [address] 
[phone] Patient: Nara Isbell MRN: [id number] Visit Information Date & Time Provider Department Dept. Phone Encounter #  
 5/14/2018  9:45 AM Tommy Obregon MD [facility] Sports Medicine and Primary Care [phone] Your Appointments 6/20/2018 10:15 AM  
Any with Tommy Obregon MD  
SPORTS MED AND PRIMARY CARE - [place] ([facility]) Appt Note: 4 month f/uDM  
 [address], [place] 305 [facility] 98  
  
   
 [address], [facility] [address] [address] [place] Upcoming Health Maintenance Date Due  
 LIPID PANEL Q1 5/17/2018 EYE EXAM RETINAL OR DILATED Q1 8/14/2018* FOBT Q 1 YEAR AGE 50-75 8/14/2018* Influenza Age 5 to Adult 8/1/2018 HEMOGLOBIN A1C Q6M 8/14/2018 FOOT EXAM Q1 2/14/2019 MICROALBUMIN Q1 2/14/2019 PAP AKA CERVICAL CYTOLOGY 5/4/2019 BREAST CANCER SCRN MAMMOGRAM 2/14/2020 DTaP/Tdap/Td series (2 - Td) 6/1/2026 *Topic was postponed. The date shown is not the original due date. Allergies as of 5/14/2018  Review Complete On: 5/14/2018 By: Baljeet Ta Severity Noted Reaction Type Reactions Aspirin  05/04/2016    Other (comments) Bronchospasm Current Immunizations  Never Reviewed No immunizations on file. Not reviewed this visit You Were Diagnosed With   
  
 Codes Comments Upper respiratory tract infection, unspecified type    -  Primary ICD-10-CM: J06.9 ICD-9-CM: 465.9 Mild intermittent asthma without complication     SEY-91-: J45.20 ICD-9-CM: 493.90 Controlled type 2 diabetes mellitus without complication, without long-term current use of insulin ([facility]     ICD-10-CM: E11.9 ICD-9-CM: 250.00  Morbid obesity with BMI of 40.0-44.9, adult (HCC)     ICD-10-CM: E66.01, Z68.41 
ICD-9-CM: 278.01, V85.41   
 Dyslipidemia     ICD-10-CM: E78.5 ICD-9-CM: 272.4 Vitals BP Pulse Temp Resp Height(growth percentile) Weight(growth percentile) 126/82 78 98.3 °F (36.8 °C) (Oral) 18 5' 4\" (1.626 m) 254 lb 1.6 oz (115.3 kg) SpO2 BMI OB Status Smoking Status 97% 43.62 kg/m2 Menopause Never Smoker Vitals History BMI and BSA Data Body Mass Index Body Surface Area  
 43.62 kg/m 2 2.28 m 2 Preferred Pharmacy Pharmacy Name Phone [id number]- Radha [place], [address] Christiane Watters [phone] Your Updated Medication List  
  
   
This list is accurate as of 5/14/18 11:07 AM.  Always use your most recent med list.  
  
  
  
  
 ADVAIR DISKUS 100-50 mcg/dose diskus inhaler Generic drug:  fluticasone-salmeterol TAKE 1 PUFF BY INHALATION TWO (2) TIMES A DAY. albuterol 90 mcg/actuation inhaler Commonly known as:  PROVENTIL HFA, VENTOLIN HFA, PROAIR HFA Take 2 Puffs by inhalation every four (4) hours as needed for Wheezing. naproxen 500 mg tablet Commonly known as:  NAPROSYN Take 1 Tab by mouth two (2) times daily (with meals). omeprazole 40 mg capsule Commonly known as:  PRILOSEC Take 1 Cap by mouth daily. predniSONE 20 mg tablet Commonly known as:  Fuller Aver Take 1 Tab by mouth two (2) times daily (after meals). valACYclovir 1 gram tablet Commonly known as:  VALTREX Take 0.5 Tabs by mouth daily. Prescriptions Sent to Pharmacy Refills  
 predniSONE (DELTASONE) 20 mg tablet 0 Sig: Take 1 Tab by mouth two (2) times daily (after meals). Class: Normal  
 Pharmacy: [facility] #: [phone] Route: Oral  
  
We Performed the Following APOLIPOPROTEIN B [id number] CPT(R)] CBC WITH AUTOMATED DIFF [97434 CPT(R)] COLLECTION VENOUS BLOOD,VENIPUNCTURE [id number] CPT(R)] HEMOGLOBIN A1C WITH EAG [33699 CPT(R)] LIPID PANEL [83246 CPT(R)] METABOLIC PANEL, COMPREHENSIVE [34895 CPT(R)] TSH 3RD GENERATION [16217 CPT(R)] URINALYSIS W/ RFLX MICROSCOPIC [75752 CPT(R)] Introducing \[facility] [place] [facility]\"" & HEALTH SERVICES! Bob Osborn introduces [employer] patient portal. Now you can access parts of your medical record, email your doctor's office, and request medication refills online. 1. In your internet browser, go to [email or web address] 2. Click on the First Time User? Click Here link in the Sign In box. You will see the New Member Sign Up page. 3. Enter your [employer] Access Code exactly as it appears below. You will not need to use this code after youve completed the sign-up process. If you do not sign up before the expiration date, you must request a new code. · [employer] Access Code: [email or web address] Expires: 8/12/2018 10:05 AM 
 
4. Enter the last four digits of your Social Security Number (xxxx) and Date of Birth (mm/dd/yyyy) as indicated and click Submit. You will be taken to the next sign-up page. 5. Create a [employer] ID. This will be your [employer] login ID and cannot be changed, so think of one that is secure and easy to remember. 6. Create a [employer] password. You can change your password at any time. 7. Enter your Password Reset Question and Answer. This can be used at a later time if you forget your password. 8. Enter your e-mail address. You will receive e-mail notification when new information is available in [address] 9. Click Sign Up. You can now view and download portions of your medical record. 10. Click the Download Summary menu link to download a portable copy of your medical information. If you have questions, please visit the Frequently Asked Questions section of the [employer] website. Remember, [employer] is NOT to be used for urgent needs. For medical emergencies, dial 911. Now available from your iPhone and Android! Please provide this summary of care documentation to your next provider. Your primary care clinician is listed as RoyalZina Arreaga. If you have any questions after today's visit, please call [phone].

## 2018-05-15 LAB
ALBUMIN SERPL-MCNC: 4.7 G/DL (ref 3.5–5.5)
ALBUMIN/GLOB SERPL: 2 {RATIO} (ref 1.2–2.2)
ALP SERPL-CCNC: 62 IU/L (ref 39–117)
ALT SERPL-CCNC: 12 IU/L (ref 0–32)
APO B SERPL-MCNC: 82 MG/DL (ref 54–133)
APPEARANCE UR: CLEAR
AST SERPL-CCNC: 15 IU/L (ref 0–40)
BACTERIA #/AREA URNS HPF: ABNORMAL /[HPF]
BASOPHILS # BLD AUTO: 0 X10E3/UL (ref 0–0.2)
BASOPHILS NFR BLD AUTO: 0 %
BILIRUB SERPL-MCNC: 0.2 MG/DL (ref 0–1.2)
BILIRUB UR QL STRIP: NEGATIVE
BUN SERPL-MCNC: 9 MG/DL (ref 6–24)
BUN/CREAT SERPL: 11 (ref 9–23)
CALCIUM SERPL-MCNC: 10.6 MG/DL (ref 8.7–10.2)
CASTS URNS QL MICRO: ABNORMAL /LPF
CHLORIDE SERPL-SCNC: 104 MMOL/L (ref 96–106)
CHOLEST SERPL-MCNC: 176 MG/DL (ref 100–199)
CO2 SERPL-SCNC: 25 MMOL/L (ref 18–29)
COLOR UR: YELLOW
CREAT SERPL-MCNC: 0.84 MG/DL (ref 0.57–1)
EOSINOPHIL # BLD AUTO: 0.1 X10E3/UL (ref 0–0.4)
EOSINOPHIL NFR BLD AUTO: 2 %
EPI CELLS #/AREA URNS HPF: ABNORMAL /HPF
ERYTHROCYTE [DISTWIDTH] IN BLOOD BY AUTOMATED COUNT: 14.3 % (ref 12.3–15.4)
EST. AVERAGE GLUCOSE BLD GHB EST-MCNC: 128 MG/DL
GFR SERPLBLD CREATININE-BSD FMLA CKD-EPI: 78 ML/MIN/1.73
GFR SERPLBLD CREATININE-BSD FMLA CKD-EPI: 90 ML/MIN/1.73
GLOBULIN SER CALC-MCNC: 2.3 G/DL (ref 1.5–4.5)
GLUCOSE SERPL-MCNC: 98 MG/DL (ref 65–99)
GLUCOSE UR QL: NEGATIVE
HBA1C MFR BLD: 6.1 % (ref 4.8–5.6)
HCT VFR BLD AUTO: 36 % (ref 34–46.6)
HDLC SERPL-MCNC: 63 MG/DL
HGB BLD-MCNC: 11.7 G/DL (ref 11.1–15.9)
HGB UR QL STRIP: NEGATIVE
IMM GRANULOCYTES # BLD: 0 X10E3/UL (ref 0–0.1)
IMM GRANULOCYTES NFR BLD: 0 %
KETONES UR QL STRIP: ABNORMAL
LDLC SERPL CALC-MCNC: 98 MG/DL (ref 0–99)
LEUKOCYTE ESTERASE UR QL STRIP: ABNORMAL
LYMPHOCYTES # BLD AUTO: 2.6 X10E3/UL (ref 0.7–3.1)
LYMPHOCYTES NFR BLD AUTO: 39 %
MCH RBC QN AUTO: 27.2 PG (ref 26.6–33)
MCHC RBC AUTO-ENTMCNC: 32.5 G/DL (ref 31.5–35.7)
MCV RBC AUTO: 84 FL (ref 79–97)
MICRO URNS: ABNORMAL
MONOCYTES # BLD AUTO: 0.6 X10E3/UL (ref 0.1–0.9)
MONOCYTES NFR BLD AUTO: 9 %
MUCOUS THREADS URNS QL MICRO: PRESENT
NEUTROPHILS # BLD AUTO: 3.4 X10E3/UL (ref 1.4–7)
NEUTROPHILS NFR BLD AUTO: 50 %
NITRITE UR QL STRIP: NEGATIVE
PH UR STRIP: 5 [PH] (ref 5–7.5)
PLATELET # BLD AUTO: 259 X10E3/UL (ref 150–379)
POTASSIUM SERPL-SCNC: 4.6 MMOL/L (ref 3.5–5.2)
PROT SERPL-MCNC: 7 G/DL (ref 6–8.5)
PROT UR QL STRIP: ABNORMAL
RBC # BLD AUTO: 4.3 X10E6/UL (ref 3.77–5.28)
RBC #/AREA URNS HPF: ABNORMAL /HPF
SODIUM SERPL-SCNC: 143 MMOL/L (ref 134–144)
SP GR UR: 1.03 (ref 1–1.03)
TRIGL SERPL-MCNC: 74 MG/DL (ref 0–149)
TSH SERPL DL<=0.005 MIU/L-ACNC: 1.79 UIU/ML (ref 0.45–4.5)
UROBILINOGEN UR STRIP-MCNC: 0.2 MG/DL (ref 0.2–1)
VLDLC SERPL CALC-MCNC: 15 MG/DL (ref 5–40)
WBC # BLD AUTO: 6.8 X10E3/UL (ref 3.4–10.8)
WBC #/AREA URNS HPF: >30 /HPF

## 2018-05-19 DIAGNOSIS — N39.0 URINARY TRACT INFECTION WITHOUT HEMATURIA, SITE UNSPECIFIED: Primary | ICD-10-CM

## 2018-05-19 RX ORDER — CIPROFLOXACIN 500 MG/1
500 TABLET ORAL 2 TIMES DAILY
Qty: 10 TAB | Refills: 0 | Status: SHIPPED | OUTPATIENT
Start: 2018-05-19 | End: 2018-05-29

## 2018-07-25 ENCOUNTER — OFFICE VISIT (OUTPATIENT)
Dept: INTERNAL MEDICINE CLINIC | Age: 53
End: 2018-07-25

## 2018-07-25 VITALS
HEART RATE: 66 BPM | TEMPERATURE: 97.7 F | RESPIRATION RATE: 18 BRPM | HEIGHT: 64 IN | WEIGHT: 251.6 LBS | SYSTOLIC BLOOD PRESSURE: 137 MMHG | DIASTOLIC BLOOD PRESSURE: 72 MMHG | OXYGEN SATURATION: 100 % | BODY MASS INDEX: 42.95 KG/M2

## 2018-07-25 DIAGNOSIS — M17.12 PRIMARY OSTEOARTHRITIS OF LEFT KNEE: ICD-10-CM

## 2018-07-25 DIAGNOSIS — E11.9 CONTROLLED TYPE 2 DIABETES MELLITUS WITHOUT COMPLICATION, WITHOUT LONG-TERM CURRENT USE OF INSULIN (HCC): Primary | ICD-10-CM

## 2018-07-25 DIAGNOSIS — E66.01 MORBID OBESITY WITH BMI OF 40.0-44.9, ADULT (HCC): ICD-10-CM

## 2018-07-25 DIAGNOSIS — F32.9 REACTIVE DEPRESSION: ICD-10-CM

## 2018-07-25 RX ORDER — PAROXETINE HYDROCHLORIDE 20 MG/1
20 TABLET, FILM COATED ORAL
Qty: 30 TAB | Refills: 11 | Status: SHIPPED | OUTPATIENT
Start: 2018-07-25 | End: 2020-02-17 | Stop reason: SDUPTHER

## 2018-07-25 NOTE — PROGRESS NOTES
[address] [place] [address] and Primary Care  [place] [address]  [address] [place] [address]  Phone:  [phone]  Fax: [phone]       Chief Complaint   Patient presents with    Asthma   . SUBJECTIVE:    Charles Wheeler is a 52 y.o. female comes in for return visit stating that she has done fairly well other than being a bit depressed of late. Her mother has dementia and she is quite concerned about this. We talked about this at length. She has also noted vague pain in her left knee aggravated with walking. She had a vague sensation in her chest.  It is difficult for her to describe but in any event it was short-lived, lasting for a few seconds and abating. She has lost several pounds since I last saw her. She does have a history of pre-diabetes above and beyond her morbid obesity. Her asthma has been reasonably stable also. Current Outpatient Prescriptions   Medication Sig Dispense Refill    PARoxetine (PAXIL) 20 mg tablet Take 1 Tab by mouth nightly. 30 Tab 11    predniSONE (DELTASONE) 20 mg tablet Take 1 Tab by mouth two (2) times daily (after meals). 10 Tab 0    valACYclovir (VALTREX) 1 gram tablet Take 0.5 Tabs by mouth daily. 30 Tab 11    naproxen (NAPROSYN) 500 mg tablet Take 1 Tab by mouth two (2) times daily (with meals). 60 Tab 11    albuterol (PROVENTIL HFA, VENTOLIN HFA, PROAIR HFA) 90 mcg/actuation inhaler Take 2 Puffs by inhalation every four (4) hours as needed for Wheezing. 1 Inhaler 11    ADVAIR DISKUS 100-50 mcg/dose diskus inhaler TAKE 1 PUFF BY INHALATION TWO (2) TIMES A DAY. 1 Inhaler 11    omeprazole (PRILOSEC) 40 mg capsule Take 1 Cap by mouth daily.  30 Cap 11     Past Medical History:   Diagnosis Date    Arthritis     Asthma      Past Surgical History:   Procedure Laterality Date    HX CHOLECYSTECTOMY      HX COLONOSCOPY  4-    tubulovillous adenoma---Dr.Christine Small    HX GI      HX GYN      s/p myomectomy, HAY and BSO     Allergies   Allergen Reactions    Aspirin Other (comments)     Bronchospasm         REVIEW OF SYSTEMS:  General: negative for - chills or fever  ENT: negative for - headaches, nasal congestion or tinnitus  Respiratory: negative for - cough, hemoptysis, shortness of breath or wheezing  Cardiovascular : negative for - chest pain, edema, palpitations or shortness of breath  Gastrointestinal: negative for - abdominal pain, blood in stools, heartburn or nausea/vomiting  Genito-Urinary: no dysuria, trouble voiding, or hematuria  Musculoskeletal: negative for - gait disturbance, joint pain, joint stiffness or joint swelling  Neurological: no TIA or stroke symptoms  Hematologic: no bruises, no bleeding, no swollen glands  Integument: no lumps, mole changes, nail changes or rash  Endocrine: no malaise/lethargy or unexpected weight changes      Social History     Social History    Marital status: LEGALLY      Spouse name: N/A    Number of children: 0    Years of education: N/A     Occupational History    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      Social History Main Topics    Smoking status: Never Smoker    Smokeless tobacco: Never Used    Alcohol use No    Drug use: No    Sexual activity: Not Asked     Other Topics Concern    None     Social History Narrative     Family History   Problem Relation Age of Onset    Cancer Mother     Cancer Father      Prostate cancer    Diabetes Sister        OBJECTIVE:    Visit Vitals    /72 (BP 1 Location: Right arm, BP Patient Position: Sitting)    Pulse 66    Temp 97.7 °F (36.5 °C) (Oral)    Resp 18    Ht 5' 4\" (1.626 m)    Wt 251 lb 9.6 oz (114.1 kg)    SpO2 100%    BMI 43.19 kg/m2     CONSTITUTIONAL: well , well nourished, appears age appropriate  EYES: perrla, eom intact  ENMT:moist mucous membranes, pharynx clear  NECK: supple.  Thyroid normal  RESPIRATORY: Chest: clear to ascultation and percussion   CARDIOVASCULAR: Heart: regular rate and rhythm  GASTROINTESTINAL: Abdomen: soft, bowel sounds active  HEMATOLOGIC: no pathological lymph nodes palpated  MUSCULOSKELETAL: Extremities: no edema, pulse 1+   INTEGUMENT: No unusual rashes or suspicious skin lesions noted. Nails appear normal.  NEUROLOGIC: non-focal exam   MENTAL STATUS: alert and oriented, appropriate affect      ASSESSMENT:  1. Controlled type 2 diabetes mellitus without complication, without long-term current use of insulin ([facility]    2. Morbid obesity with BMI of 40.0-44.9, adult ([facility]    3. Primary osteoarthritis of left knee    4. Reactive depression        PLAN:    1. Her pre-diabetes is doing well. I will await the results of the hemoglobin A1c. Emphasis has been placed on weight reduction. 2. As far as her obesity is concerned, emphasis is placed on ways that weight loss can indeed continue to occur. She has to reduce the consumption of processed carbohydrates above and beyond eating meals and eliminating the consumption of snacks. 3. She does have mild osteoarthritis of her left knee and weight loss is the most important thing that can be done. She can take prn use of naproxen. 4. She has an element of depression. She does indeed want some assistance and therefore I will place her on Paxil 20 mg qhs. .  Orders Placed This Encounter    HEMOGLOBIN A1C WITH EAG    PARoxetine (PAXIL) 20 mg tablet         Follow-up Disposition:  Return in about 3 months (around 10/25/2018).       Melissa Siegel MD

## 2018-07-25 NOTE — PROGRESS NOTES
1. Have you been to the ER, urgent care clinic since your last visit? Hospitalized since your last visit? No    2. Have you seen or consulted any other health care providers outside of the [address] [place] [address] since your last visit? Include any pap smears or colon screening.  No

## 2018-07-25 NOTE — MR AVS SNAPSHOT
[address], [place] [address] [place] [address] 
[phone] Patient: Sandro Villanueva MRN: [id number] AIZ:7/11/2520 Visit Information Date & Time Provider Department Dept. Phone Encounter #  
 7/25/2018 10:00 AM Frandy Chaidez MD [facility] - [place] [facility] [phone] [id number] Your Appointments 10/31/2018 10:30 AM  
Any with Frandy Chaidez MD  
[facility] [place] [facility] Appt Note: 3 month follow up [address], [place] [address] [facility] 98  
  
   
 [address], [place] [address] [place] [address] Upcoming Health Maintenance Date Due  
 EYE EXAM RETINAL OR DILATED Q1 8/14/2018* FOBT Q 1 YEAR AGE 50-75 8/14/2018* Influenza Age 5 to Adult 8/1/2018 HEMOGLOBIN A1C Q6M 11/14/2018 FOOT EXAM Q1 2/14/2019 MICROALBUMIN Q1 2/14/2019 PAP AKA CERVICAL CYTOLOGY 5/4/2019 LIPID PANEL Q1 5/14/2019 BREAST CANCER SCRN MAMMOGRAM 2/14/2020 DTaP/Tdap/Td series (2 - Td) 6/1/2026 *Topic was postponed. The date shown is not the original due date. Allergies as of 7/25/2018  Review Complete On: 7/25/2018 By: Manan Sanches Severity Noted Reaction Type Reactions Aspirin  05/04/2016    Other (comments) Bronchospasm Current Immunizations  Never Reviewed No immunizations on file. Not reviewed this visit You Were Diagnosed With   
  
 Codes Comments Controlled type 2 diabetes mellitus without complication, without long-term current use of insulin ([facility]    -  Primary ICD-10-CM: E11.9 ICD-9-CM: 250.00 Morbid obesity with BMI of 40.0-44.9, adult (HCC)     ICD-10-CM: E66.01, Z68.41 
ICD-9-CM: 278.01, V85.41 Primary osteoarthritis of left knee     ICD-10-CM: M17.12 
ICD-9-CM: 715.16 Reactive depression     ICD-10-CM: F32.9 ICD-9-CM: 300.4 Vitals BP Pulse Temp Resp Height(growth percentile) Weight(growth percentile)  
 137/72 (BP 1 Location: Right arm, BP Patient Position: Sitting) 66 97.7 °F (36.5 °C) (Oral) 18 5' 4\" (1.626 m) 251 lb 9.6 oz (114.1 kg) SpO2 BMI OB Status Smoking Status 100% 43.19 kg/m2 Menopause Never Smoker BMI and BSA Data Body Mass Index Body Surface Area  
 43.19 kg/m 2 2.27 m 2 Preferred Pharmacy Pharmacy Name Phone [facility]/PHARMACY #[id number]- Rosalie Noonan, [address] [place]ie [place] [phone] Your Updated Medication List  
  
   
This list is accurate as of 7/25/18 11:00 AM.  Always use your most recent med list.  
  
  
  
  
 ADVAIR DISKUS 100-50 mcg/dose diskus inhaler Generic drug:  fluticasone-salmeterol TAKE 1 PUFF BY INHALATION TWO (2) TIMES A DAY. albuterol 90 mcg/actuation inhaler Commonly known as:  PROVENTIL HFA, VENTOLIN HFA, PROAIR HFA Take 2 Puffs by inhalation every four (4) hours as needed for Wheezing. naproxen 500 mg tablet Commonly known as:  NAPROSYN Take 1 Tab by mouth two (2) times daily (with meals). omeprazole 40 mg capsule Commonly known as:  PRILOSEC Take 1 Cap by mouth daily. PARoxetine 20 mg tablet Commonly known as:  PAXIL Take 1 Tab by mouth nightly. predniSONE 20 mg tablet Commonly known as:  [place] Take 1 Tab by mouth two (2) times daily (after meals). valACYclovir 1 gram tablet Commonly known as:  VALTREX Take 0.5 Tabs by mouth daily. Prescriptions Sent to Pharmacy Refills PARoxetine (PAXIL) 20 mg tablet 11 Sig: Take 1 Tab by mouth nightly. Class: Normal  
 Pharmacy: [facility]/pharmacy [address] Ph #: [phone] Route: Oral  
  
We Performed the Following HEMOGLOBIN A1C WITH EAG [10937 CPT(R)] Introducing [facility] & HEALTH SERVICES!    
 Kayli Montero introduces "[employer] patient portal. Now you can access parts of your medical record, email your doctor's office, and request medication refills online. 1. In your internet browser, go to [email or web address] 2. Click on the First Time User? Click Here link in the Sign In box. You will see the New Member Sign Up page. 3. Enter your [employer] Access Code exactly as it appears below. You will not need to use this code after youve completed the sign-up process. If you do not sign up before the expiration date, you must request a new code. · [employer] Access Code: [email or web address] Expires: 8/12/2018 10:05 AM 
 
4. Enter the last four digits of your Social Security Number (xxxx) and Date of Birth (mm/dd/yyyy) as indicated and click Submit. You will be taken to the next sign-up page. 5. Create a [employer] ID. This will be your [employer] login ID and cannot be changed, so think of one that is secure and easy to remember. 6. Create a [employer] password. You can change your password at any time. 7. Enter your Password Reset Question and Answer. This can be used at a later time if you forget your password. 8. Enter your e-mail address. You will receive e-mail notification when new information is available in [address] 9. Click Sign Up. You can now view and download portions of your medical record. 10. Click the Download Summary menu link to download a portable copy of your medical information. If you have questions, please visit the Frequently Asked Questions section of the [employer] website. Remember, [employer] is NOT to be used for urgent needs. For medical emergencies, dial 911. Now available from your iPhone and Android! Please provide this summary of care documentation to your next provider. Your primary care clinician is listed as Uyen Arreaga. If you have any questions after today's visit, please call [phone].

## 2018-07-26 LAB — HBA1C MFR BLD: NORMAL %

## 2018-08-05 NOTE — PROGRESS NOTES
Repeat hemoglobin A1c in a time this month patient should probably return downtown since difficulty with venipuncture occurred previously

## 2018-08-06 ENCOUNTER — TELEPHONE (OUTPATIENT)
Dept: INTERNAL MEDICINE CLINIC | Age: 53
End: 2018-08-06

## 2018-08-07 ENCOUNTER — LAB ONLY (OUTPATIENT)
Dept: INTERNAL MEDICINE CLINIC | Age: 53
End: 2018-08-07

## 2018-08-07 DIAGNOSIS — E11.9 CONTROLLED TYPE 2 DIABETES MELLITUS WITHOUT COMPLICATION, WITHOUT LONG-TERM CURRENT USE OF INSULIN (HCC): Primary | ICD-10-CM

## 2018-08-08 LAB
EST. AVERAGE GLUCOSE BLD GHB EST-MCNC: 131 MG/DL
HBA1C MFR BLD: 6.2 % (ref 4.8–5.6)

## 2018-09-07 ENCOUNTER — OFFICE VISIT (OUTPATIENT)
Dept: INTERNAL MEDICINE CLINIC | Age: 53
End: 2018-09-07

## 2018-09-07 DIAGNOSIS — E11.9 CONTROLLED TYPE 2 DIABETES MELLITUS WITHOUT COMPLICATION, WITHOUT LONG-TERM CURRENT USE OF INSULIN (HCC): ICD-10-CM

## 2018-09-07 DIAGNOSIS — H81.11 BENIGN PAROXYSMAL POSITIONAL VERTIGO OF RIGHT EAR: Primary | ICD-10-CM

## 2018-09-07 DIAGNOSIS — E66.01 MORBID OBESITY WITH BMI OF 40.0-44.9, ADULT (HCC): ICD-10-CM

## 2018-09-07 NOTE — MR AVS SNAPSHOT
[address] [place] [address] [place] [address] [address] 
[phone] Patient: Nancy Mckoy MRN: [id number] IGU:9/99/6450 Visit Information Date & Time Provider Department Dept. Phone Encounter #  
 9/7/2018 11:00 AM Ok Matson MD [facility] [phone] [id number] Your Appointments 10/31/2018 10:30 AM  
Any with Ok Matson MD  
SPORTS MED AND PRIMARY CARE - Sherwin Titus ([facility]) Appt Note: 3 month follow up [address], [place] [address] [place] [address] [facility] 98  
  
   
 [address], [place] [address] [facility] [place] [address], [facility] Upcoming Health Maintenance Date Due FOBT Q 1 YEAR AGE 50-75 5/4/2017 Influenza Age 5 to Adult 8/1/2018 EYE EXAM RETINAL OR DILATED Q1 10/7/2018* HEMOGLOBIN A1C Q6M 2/7/2019 FOOT EXAM Q1 2/14/2019 MICROALBUMIN Q1 2/14/2019 PAP AKA CERVICAL CYTOLOGY 5/4/2019 LIPID PANEL Q1 5/14/2019 BREAST CANCER SCRN MAMMOGRAM 2/14/2020 DTaP/Tdap/Td series (2 - Td) 6/1/2026 *Topic was postponed. The date shown is not the original due date. Allergies as of 9/7/2018  Review Complete On: 9/7/2018 By: Lian Infante Severity Noted Reaction Type Reactions Aspirin  05/04/2016    Other (comments) Bronchospasm Current Immunizations  Never Reviewed No immunizations on file. Not reviewed this visit You Were Diagnosed With   
  
 Codes Comments Benign paroxysmal positional vertigo of right ear    -  Primary ICD-10-CM: H81.11 
ICD-9-CM: 386.11 Morbid obesity with BMI of 40.0-44.9, adult (HCC)     ICD-10-CM: E66.01, Z68.41 
ICD-9-CM: 278.01, V85.41 Controlled type 2 diabetes mellitus without complication, without long-term current use of insulin ([facility]     ICD-10-CM: E11.9 ICD-9-CM: 250.00 Vitals BP Pulse Temp Resp Height(growth percentile) Weight(growth percentile) 161/88 69 98.2 °F (36.8 °C) (Oral) 16 5' 4\" (1.626 m) 250 lb 9.6 oz (113.7 kg) SpO2 BMI OB Status Smoking Status 100% 43.02 kg/m2 Menopause Never Smoker Vitals History BMI and BSA Data Body Mass Index Body Surface Area 43.02 kg/m 2 2.27 m 2 Preferred Pharmacy Pharmacy Name Phone [facility]/PHARMACY #[id number]- Fabian Masters, [address] Vicki Hernandez [phone] Your Updated Medication List  
  
   
This list is accurate as of 9/7/18  1:06 PM.  Always use your most recent med list.  
  
  
  
  
 ADVAIR DISKUS 100-50 mcg/dose diskus inhaler Generic drug:  fluticasone-salmeterol TAKE 1 PUFF BY INHALATION TWO (2) TIMES A DAY. albuterol 90 mcg/actuation inhaler Commonly known as:  PROVENTIL HFA, VENTOLIN HFA, PROAIR HFA Take 2 Puffs by inhalation every four (4) hours as needed for Wheezing. naproxen 500 mg tablet Commonly known as:  NAPROSYN Take 1 Tab by mouth two (2) times daily (with meals). omeprazole 40 mg capsule Commonly known as:  PRILOSEC Take 1 Cap by mouth daily. PARoxetine 20 mg tablet Commonly known as:  PAXIL Take 1 Tab by mouth nightly. valACYclovir 1 gram tablet Commonly known as:  VALTREX Take 0.5 Tabs by mouth daily. Introducing [facility] & HE[facility] SERVICES! Nichole Will introduces [employer] patient portal. Now you can access parts of your medical record, email your doctor's office, and request medication refills online. 1. In your internet browser, go to [email or web address] 2. Click on the First Time User? Click Here link in the Sign In box. You will see the New Member Sign Up page. 3. Enter your [employer] Access Code exactly as it appears below. You will not need to use this code after youve completed the sign-up process. If you do not sign up before the expiration date, you must request a new code. · [employer] Access Code: [email or web address] Expires: 12/6/2018 12:17 PM 
 
 4. Enter the last four digits of your Social Security Number (xxxx) and Date of Birth (mm/dd/yyyy) as indicated and click Submit. You will be taken to the next sign-up page. 5. Create a [employer] ID. This will be your [employer] login ID and cannot be changed, so think of one that is secure and easy to remember. 6. Create a [employer] password. You can change your password at any time. 7. Enter your Password Reset Question and Answer. This can be used at a later time if you forget your password. 8. Enter your e-mail address. You will receive e-mail notification when new information is available in [address] 9. Click Sign Up. You can now view and download portions of your medical record. 10. Click the Download Summary menu link to download a portable copy of your medical information. If you have questions, please visit the Frequently Asked Questions section of the [employer] website. Remember, [employer] is NOT to be used for urgent needs. For medical emergencies, dial 911. Now available from your iPhone and Android! Please provide this summary of care documentation to your next provider. Your primary care clinician is listed as Uyen Arreaga. If you have any questions after today's visit, please call [phone].

## 2018-09-07 NOTE — PROGRESS NOTES
[address] [place] [address] and Primary Care 
[place] [address] 
[address] [facility] [address] Phone:  [phone]  Fax: [phone] Chief Complaint Patient presents with  Dizziness Patient states that when she woke up on Monday morning she was really dizzy and nausous. She also states that she was really hungry \"for some reason\". SUBECTIVE: 
 
Hai Hammonds is a 52 y.o. female Comes in for return visit stating that yesterday upon standing, she became extremely dizzy, described as a spinning sensation. This was rather intense for the day. Subsequent days, including yesterday and today, she is better. She denies any similar symptoms previously. She does have a history of morbid obesity and diabetes mellitus. Current Outpatient Prescriptions Medication Sig Dispense Refill  PARoxetine (PAXIL) 20 mg tablet Take 1 Tab by mouth nightly. 30 Tab 11  
 valACYclovir (VALTREX) 1 gram tablet Take 0.5 Tabs by mouth daily. 30 Tab 11  
 naproxen (NAPROSYN) 500 mg tablet Take 1 Tab by mouth two (2) times daily (with meals). 60 Tab 11  
 albuterol (PROVENTIL HFA, VENTOLIN HFA, PROAIR HFA) 90 mcg/actuation inhaler Take 2 Puffs by inhalation every four (4) hours as needed for Wheezing. 1 Inhaler 11  ADVAIR DISKUS 100-50 mcg/dose diskus inhaler TAKE 1 PUFF BY INHALATION TWO (2) TIMES A DAY. 1 Inhaler 11  
 omeprazole (PRILOSEC) 40 mg capsule Take 1 Cap by mouth daily. 30 Cap 11 Past Medical History:  
Diagnosis Date  Arthritis  Asthma Past Surgical History:  
Procedure Laterality Date  HX CHOLECYSTECTOMY  HX COLONOSCOPY  4-  
 tubulovillous adenoma---Dr.Christine [place]  HX GI    
 HX GYN    
 s/p myomectomy, HAY and BSO Allergies Allergen Reactions  Aspirin Other (comments) Bronchospasm REVIEW OF SYSTEMS: 
Review of Systems - Negative except ENT ROS: negative for - headaches, hearing change, nasal congestion, oral lesions, tinnitus, visual changes or Respiratory ROS: no cough, shortness of breath, or wheezing Cardiovascular ROS: no chest pain or dyspnea on exertion Gastrointestinal ROS: no abdominal pain, change in bowel habits, or black or blood Genito-Urinary ROS: no dysuria, trouble voiding, or hematuria Musculoskeletal ROS: negative Neurological ROS: no TIA or stroke symptoms Social History Social History  Marital status: LEGALLY  Spouse name: N/A  
 Number of children: 0  
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  
 Drug use: No  
 Sexual activity: Yes Other Topics Concern  None Social History Narrative  
r Family History Problem Relation Age of Onset  Cancer Mother  Cancer Father Prostate cancer  Diabetes Sister OBJECTIVE: 
Visit Vitals  /88 Comment: [id number]  Pulse 69  Temp 98.2 °F (36.8 °C) (Oral)  Resp 16  
 Ht 5' 4\" (1.626 m)  Wt 250 lb 9.6 oz (113.7 kg)  SpO2 100%  BMI 43.02 kg/m2 ENT: perrla,  eom intact,mild lateral nystagmus NECK: supple. Thyroid normal 
CHEST: clear to ascultation and percussion HEART: regular rate and rhythm ABD: soft, bowel sounds active EXTREMITIES: no edema, pulse 1+ ASSESSMENT: 
1. Benign paroxysmal positional vertigo of right ear 2. Morbid obesity with BMI of 40.0-44.9, adult ([address] 3. Controlled type 2 diabetes mellitus without complication, without long-term current use of insulin ([address] PLAN: 
 
1. The patient has positional vertigo. This is a self-limiting process. I explained this to the patient. She declines any treatment at this point. Continued progress and complete abatement is anticipated. 2. I again encouraged her to lose weight. This can be accomplished by eating meals, eliminating snacks and avoiding the consumption of processed carbohydrates. 3. Her diabetes is doing quite well based on her last hemoglobin A1c. Follow-up Disposition: 
Return keep old apt.  
 
Joanne Fontenot MD

## 2018-09-07 NOTE — PROGRESS NOTES
Chief Complaint Patient presents with  Dizziness Patient states that when she woke up on Monday morning she was really dizzy and nausous. She also states that she was really hungry \"for some reason\". 1. Have you been to the ER, urgent care clinic since your last visit? Hospitalized since your last visit? No 
 
2. Have you seen or consulted any other health care providers outside of the [address] [place] [address] since your last visit? Include any pap smears or colon screening.  No

## 2018-09-08 VITALS
RESPIRATION RATE: 16 BRPM | WEIGHT: 250.6 LBS | BODY MASS INDEX: 42.78 KG/M2 | HEIGHT: 64 IN | SYSTOLIC BLOOD PRESSURE: 161 MMHG | TEMPERATURE: 98.2 F | DIASTOLIC BLOOD PRESSURE: 88 MMHG | HEART RATE: 69 BPM | OXYGEN SATURATION: 100 %

## 2018-09-15 RX ORDER — CEPHALEXIN 250 MG/1
CAPSULE ORAL
Qty: 1 INHALER | Refills: 11 | Status: SHIPPED | OUTPATIENT
Start: 2018-09-15 | End: 2019-10-02 | Stop reason: SDUPTHER

## 2018-10-31 ENCOUNTER — OFFICE VISIT (OUTPATIENT)
Dept: INTERNAL MEDICINE CLINIC | Age: 53
End: 2018-10-31

## 2018-10-31 VITALS
HEIGHT: 64 IN | BODY MASS INDEX: 42.44 KG/M2 | SYSTOLIC BLOOD PRESSURE: 152 MMHG | TEMPERATURE: 97.8 F | HEART RATE: 60 BPM | OXYGEN SATURATION: 100 % | WEIGHT: 248.6 LBS | RESPIRATION RATE: 16 BRPM | DIASTOLIC BLOOD PRESSURE: 79 MMHG

## 2018-10-31 DIAGNOSIS — E11.9 CONTROLLED TYPE 2 DIABETES MELLITUS WITHOUT COMPLICATION, WITHOUT LONG-TERM CURRENT USE OF INSULIN (HCC): ICD-10-CM

## 2018-10-31 DIAGNOSIS — H81.10 BENIGN PAROXYSMAL POSITIONAL VERTIGO, UNSPECIFIED LATERALITY: Primary | ICD-10-CM

## 2018-10-31 DIAGNOSIS — E66.01 MORBID OBESITY WITH BMI OF 40.0-44.9, ADULT (HCC): ICD-10-CM

## 2018-10-31 DIAGNOSIS — R19.5 OCCULT BLOOD IN STOOLS: ICD-10-CM

## 2018-10-31 DIAGNOSIS — H01.005 BLEPHARITIS OF LEFT LOWER EYELID, UNSPECIFIED TYPE: ICD-10-CM

## 2018-10-31 RX ORDER — MECLIZINE HCL 12.5 MG 12.5 MG/1
12.5 TABLET ORAL
Qty: 60 TAB | Refills: 2 | Status: SHIPPED | OUTPATIENT
Start: 2018-10-31 | End: 2019-05-23 | Stop reason: SDUPTHER

## 2018-10-31 RX ORDER — LORATADINE 10 MG/1
10 TABLET ORAL
COMMUNITY

## 2018-10-31 NOTE — LETTER
11/6/2018 9:19 PM 
 
Ms. Inna Zhou [address] [place] [id number] Dear Inna Zhou: 
 
Please find your most recent results below. Resulted Orders HEMOGLOBIN A1C WITH EAG Result Value Ref Range Hemoglobin A1c 6.2 (H) 4.8 - 5.6 % Comment:  
            Prediabetes: 5.7 - 6.4 Diabetes: >6.4 Glycemic control for adults with diabetes: <7.0 Estimated average glucose 131 mg/dL Narrative Performed at:  [address]  [id number] : Mary Carrasco MD, Phone:  [phone] RECOMMENDATIONS: 
Stable diabetic control. Please call me if you have any questions: [phone] Sincerely, Genny Cope MD

## 2018-10-31 NOTE — PROGRESS NOTES
Chief Complaint Patient presents with  Diabetes 3 month follow up 1. Have you been to the ER, urgent care clinic since your last visit? Hospitalized since your last visit? No 
 
2. Have you seen or consulted any other health care providers outside of the [address] [place] [address] since your last visit? Include any pap smears or colon screening.  No

## 2018-11-01 LAB
EST. AVERAGE GLUCOSE BLD GHB EST-MCNC: 131 MG/DL
HBA1C MFR BLD: 6.2 % (ref 4.8–5.6)

## 2018-11-04 NOTE — PROGRESS NOTES
[address] [place] [address] and Primary Care 
[place] [address] 
[address] [facility] [address] Phone:  [phone]  Fax: [phone] Chief Complaint Patient presents with  Diabetes 3 month follow up Fidel Weiss SUBJECTIVE: 
  Heike Victor is a 52 y.o. female Comes in for return visit stating she has done well. Unfortunately she has not lost any weight. Recently she has had dizziness described as a spinning sensation, occurring primarily with change in position. This is getting better. It has been present now for last two weeks. She has also had slight irritation of her left lower lid in that it itches. Finally, she does have a history of diabetes, which has been diet controlled to date. She is reasonably physically active, working on a daily basis. Current Outpatient Medications Medication Sig Dispense Refill  loratadine (CLARITIN) 10 mg tablet Take 10 mg by mouth daily as needed for Allergies.  meclizine (ANTIVERT) 12.5 mg tablet Take 1 Tab by mouth two (2) times daily as needed for up to 10 days. 60 Tab 2  ADVAIR DISKUS 100-50 mcg/dose diskus inhaler INHALE 1 DOSE BY MOUTH TWICE DAILY. RINSE MOUTH AFTER USE 1 Inhaler 11  
 PARoxetine (PAXIL) 20 mg tablet Take 1 Tab by mouth nightly. 30 Tab 11  
 valACYclovir (VALTREX) 1 gram tablet Take 0.5 Tabs by mouth daily. 30 Tab 11  
 naproxen (NAPROSYN) 500 mg tablet Take 1 Tab by mouth two (2) times daily (with meals). 60 Tab 11  
 albuterol (PROVENTIL HFA, VENTOLIN HFA, PROAIR HFA) 90 mcg/actuation inhaler Take 2 Puffs by inhalation every four (4) hours as needed for Wheezing. 1 Inhaler 11  
 omeprazole (PRILOSEC) 40 mg capsule Take 1 Cap by mouth daily. 30 Cap 11 Past Medical History:  
Diagnosis Date  Arthritis  Asthma Past Surgical History:  
Procedure Laterality Date  HX CHOLECYSTECTOMY  HX COLONOSCOPY  4-  
 tubulovillous adenoma---Dr.Christine Dianna Cochran  HX GI    
  HX GYN    
 s/p myomectomy, HAY and BSO Allergies Allergen Reactions  Aspirin Other (comments) Bronchospasm REVIEW OF SYSTEMS: 
General: negative for - chills or fever ENT: negative for - headaches, nasal congestion or tinnitus Respiratory: negative for - cough, hemoptysis, shortness of breath or wheezing Cardiovascular : negative for - chest pain, edema, palpitations or shortness of breath Gastrointestinal: negative for - abdominal pain, blood in stools, heartburn or nausea/vomiting Genito-Urinary: no dysuria, trouble voiding, or hematuria Musculoskeletal: negative for - gait disturbance, joint pain, joint stiffness or joint swelling Neurological: no TIA or stroke symptoms Hematologic: no bruises, no bleeding, no swollen glands Integument: no lumps, mole changes, nail changes or rash Endocrine: no malaise/lethargy or unexpected weight changes Social History Socioeconomic History  Marital status: LEGALLY  Spouse name: Not on file  Number of children: 0  
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  
 Drug use: No  
 Sexual activity: Yes Other Topics Concern  Not on file Social History Narrative  Not on file Family History Problem Relation Age of Onset  Cancer Mother  Cancer Father Prostate cancer  Diabetes Sister OBJECTIVE: 
 
Visit Vitals /79 Pulse 60 Temp 97.8 °F (36.6 °C) (Oral) Resp 16  
 Ht 5' 4\" (1.626 m) Wt 248 lb 9.6 oz (112.8 kg) SpO2 100% BMI 42.67 kg/m² CONSTITUTIONAL: well , well nourished, appears age appropriate EYES: perrla, eom intact ENMT:moist mucous membranes, pharynx clear NECK: supple. Thyroid normal 
RESPIRATORY: Chest: clear to ascultation and percussion CARDIOVASCULAR: Heart: regular rate and rhythm GASTROINTESTINAL: Abdomen: soft, bowel sounds active HEMATOLOGIC: no pathological lymph nodes palpated MUSCULOSKELETAL: Extremities: no edema, pulse 1+ INTEGUMENT: No unusual rashes or suspicious skin lesions noted. Nails appear normal. 
NEUROLOGIC: non-focal exam, no nystagmus MENTAL STATUS: alert and oriented, appropriate affect ASSESSMENT: 
1. Benign paroxysmal positional vertigo, unspecified laterality 2. Blepharitis of left lower eyelid, unspecified type 3. Morbid obesity with BMI of 40.0-44.9, adult ([facility] [address] 4. Controlled type 2 diabetes mellitus without complication, without long-term current use of insulin ([facility] [address] 5. Occult blood in stools PLAN: 
 
1. She has labyrinthitis as the etiology of her positional vertigo. Because it has been present for a modest time I will give her Meclizine 12.5 mg t.i.d. 2. She has a mild blepharitis of her left lid. She is to use Ocusoft wipes at least 3-4 times a day for the next five days. 3. I encouraged her to lose weight, which can be accomplished by eating meals, eliminating snacks, avoiding the consumption of processed carbohydrates. 4. Hgb A1c will be checked. Again, the emphasis is weight reduction. . 
Orders Placed This Encounter  OCCULT BLOOD IMMUNOASSAY,DIAGNOSTIC  
 HEMOGLOBIN A1C WITH EAG  
 loratadine (CLARITIN) 10 mg tablet  meclizine (ANTIVERT) 12.5 mg tablet Follow-up Disposition: 
Return in about 4 months (around 2/28/2019).  
 
 
Jimena Tenorio MD

## 2018-11-17 LAB — HEMOCCULT STL QL IA: NORMAL

## 2019-01-14 RX ORDER — ALBUTEROL SULFATE 90 UG/1
2 AEROSOL, METERED RESPIRATORY (INHALATION)
Qty: 1 INHALER | Refills: 11 | Status: SHIPPED | OUTPATIENT
Start: 2019-01-14 | End: 2020-01-26

## 2019-03-20 ENCOUNTER — OFFICE VISIT (OUTPATIENT)
Dept: INTERNAL MEDICINE CLINIC | Age: 54
End: 2019-03-20

## 2019-03-20 VITALS
WEIGHT: 254.2 LBS | RESPIRATION RATE: 16 BRPM | SYSTOLIC BLOOD PRESSURE: 139 MMHG | TEMPERATURE: 98.7 F | DIASTOLIC BLOOD PRESSURE: 76 MMHG | HEART RATE: 66 BPM | BODY MASS INDEX: 43.4 KG/M2 | HEIGHT: 64 IN | OXYGEN SATURATION: 100 %

## 2019-03-20 DIAGNOSIS — J45.909 REACTIVE AIRWAY DISEASE WITHOUT COMPLICATION, UNSPECIFIED ASTHMA SEVERITY, UNSPECIFIED WHETHER PERSISTENT: ICD-10-CM

## 2019-03-20 DIAGNOSIS — E11.9 CONTROLLED TYPE 2 DIABETES MELLITUS WITHOUT COMPLICATION, WITHOUT LONG-TERM CURRENT USE OF INSULIN (HCC): Primary | ICD-10-CM

## 2019-03-20 DIAGNOSIS — E66.01 MORBID OBESITY WITH BMI OF 40.0-44.9, ADULT (HCC): ICD-10-CM

## 2019-03-20 NOTE — PROGRESS NOTES
Chief Complaint Patient presents with  Diabetes 4 month follow up 1. Have you been to the ER, urgent care clinic since your last visit? Hospitalized since your last visit? No 
 
2. Have you seen or consulted any other health care providers outside of the [address] [place] [address] since your last visit? Include any pap smears or colon screening.  No\

## 2019-03-20 NOTE — PROGRESS NOTES
Chief Complaint Patient presents with  Diabetes 4 month follow up Celestino Carvajal SUBECTIVE: 
 
Kirt Nguyen is a 62 y.o. female Comes in for return visit stating that she feels great. Unfortunately she has gained weight, specifically __________ pounds since I last saw her. This is related to dietary indiscretion. She does indeed have diabetes mellitus and her hemoglobin A1c's have been __________ normal to date. Her asthma has been reasonably stable also. Current Outpatient Medications Medication Sig Dispense Refill  albuterol (PROVENTIL HFA, VENTOLIN HFA, PROAIR HFA) 90 mcg/actuation inhaler Take 2 Puffs by inhalation every four (4) hours as needed for Wheezing. 1 Inhaler 11  
 loratadine (CLARITIN) 10 mg tablet Take 10 mg by mouth daily as needed for Allergies.  ADVAIR DISKUS 100-50 mcg/dose diskus inhaler INHALE 1 DOSE BY MOUTH TWICE DAILY. RINSE MOUTH AFTER USE 1 Inhaler 11  
 PARoxetine (PAXIL) 20 mg tablet Take 1 Tab by mouth nightly. 30 Tab 11  
 valACYclovir (VALTREX) 1 gram tablet Take 0.5 Tabs by mouth daily. 30 Tab 11  
 naproxen (NAPROSYN) 500 mg tablet Take 1 Tab by mouth two (2) times daily (with meals). 60 Tab 11  
 omeprazole (PRILOSEC) 40 mg capsule Take 1 Cap by mouth daily. 30 Cap 11 Past Medical History:  
Diagnosis Date  Arthritis  Asthma Past Surgical History:  
Procedure Laterality Date  HX CHOLECYSTECTOMY  HX COLONOSCOPY  4-  
 tubulovillous adenoma---Dr.Christine Rashad Cui   GI    
 HX GYN    
 s/p myomectomy, HAY and BSO Allergies Allergen Reactions  Aspirin Other (comments) Bronchospasm REVIEW OF SYSTEMS: 
Review of Systems - Negative except ENT ROS: negative for - headaches, hearing change, nasal congestion, oral lesions, tinnitus, visual changes or Respiratory ROS: no cough, shortness of breath, or wheezing Cardiovascular ROS: no chest pain or dyspnea on exertion Gastrointestinal ROS: no abdominal pain, change in bowel habits, or black or blood Genito-Urinary ROS: no dysuria, trouble voiding, or hematuria Musculoskeletal ROS: negative Neurological ROS: no TIA or stroke symptoms Social History Socioeconomic History  Marital status: LEGALLY  Spouse name: Not on file  Number of children: 0  
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  
 Drug use: No  
 Sexual activity: Yes  
r Family History Problem Relation Age of Onset  Cancer Mother  Cancer Father Prostate cancer  Diabetes Sister OBJECTIVE: 
Visit Vitals /76 Pulse 66 Temp 98.7 °F (37.1 °C) (Oral) Resp 16 Ht 5' 4\" (1.626 m) Wt 254 lb 3.2 oz (115.3 kg) SpO2 100% BMI 43.63 kg/m² ENT: perrla,  eom intact NECK: supple. Thyroid normal, no JVD CHEST: clear to ascultation and percussion HEART: regular rate and rhythm ABD: soft, bowel sounds active, EXTREMITIES: no edema, pulse 1+ INTEGUMENT: clear ASSESSMENT: 
1. Controlled type 2 diabetes mellitus without complication, without long-term current use of insulin ([address] 2. Morbid obesity with BMI of 40.0-44.9, adult ([address] 3. Reactive airway disease without complication, unspecified asthma severity, unspecified whether persistent PLAN: 
 
1. Hopefully her diabetes is doing well. I emphasized the importance of weight reduction to minimize potential progression. Hgb A1c will be checked. 2. As far as her weight, she needs to eat meals, eliminate snacks and avoid the consumption of processed carbohydrates. 3. Her reactive airway disease is quite stable. She has had no major flare of late. Nancy Kellogg Orders Placed This Encounter  MICROALBUMIN, UR, RAND  HEMOGLOBIN A1C WITH EAG Follow-up Disposition: Not on File Elisha Moritz, MD

## 2019-03-21 LAB
ALBUMIN/CREAT UR: 9.8 MG/G CREAT (ref 0–30)
CREAT UR-MCNC: 89.1 MG/DL
EST. AVERAGE GLUCOSE BLD GHB EST-MCNC: 137 MG/DL
HBA1C MFR BLD: 6.4 % (ref 4.8–5.6)
MICROALBUMIN UR-MCNC: 8.7 UG/ML

## 2019-05-23 DIAGNOSIS — H81.10 BENIGN PAROXYSMAL POSITIONAL VERTIGO, UNSPECIFIED LATERALITY: ICD-10-CM

## 2019-05-25 RX ORDER — MECLIZINE HCL 12.5 MG 12.5 MG/1
12.5 TABLET ORAL
Qty: 60 TAB | Refills: 2 | Status: SHIPPED | OUTPATIENT
Start: 2019-05-25 | End: 2019-06-04

## 2020-02-04 ENCOUNTER — OFFICE VISIT (OUTPATIENT)
Dept: INTERNAL MEDICINE CLINIC | Age: 55
End: 2020-02-04

## 2020-02-04 VITALS
SYSTOLIC BLOOD PRESSURE: 147 MMHG | HEIGHT: 64 IN | HEART RATE: 109 BPM | BODY MASS INDEX: 44.75 KG/M2 | DIASTOLIC BLOOD PRESSURE: 93 MMHG | WEIGHT: 262.1 LBS | OXYGEN SATURATION: 98 % | TEMPERATURE: 99.3 F | RESPIRATION RATE: 20 BRPM

## 2020-02-04 DIAGNOSIS — J18.9 PNEUMONITIS: ICD-10-CM

## 2020-02-04 DIAGNOSIS — J45.40 MODERATE PERSISTENT REACTIVE AIRWAY DISEASE WITHOUT COMPLICATION: ICD-10-CM

## 2020-02-04 DIAGNOSIS — E66.01 MORBID OBESITY WITH BMI OF 40.0-44.9, ADULT (HCC): ICD-10-CM

## 2020-02-04 DIAGNOSIS — J06.9 UPPER RESPIRATORY TRACT INFECTION, UNSPECIFIED TYPE: Primary | ICD-10-CM

## 2020-02-04 DIAGNOSIS — F32.9 REACTIVE DEPRESSION: ICD-10-CM

## 2020-02-04 DIAGNOSIS — E11.9 CONTROLLED TYPE 2 DIABETES MELLITUS WITHOUT COMPLICATION, WITHOUT LONG-TERM CURRENT USE OF INSULIN (HCC): ICD-10-CM

## 2020-02-04 RX ORDER — PREDNISONE 20 MG/1
20 TABLET ORAL
Qty: 21 TAB | Refills: 0 | Status: SHIPPED | OUTPATIENT
Start: 2020-02-04 | End: 2020-02-17

## 2020-02-04 RX ORDER — CEFUROXIME AXETIL 500 MG/1
500 TABLET ORAL 2 TIMES DAILY
Qty: 14 TAB | Refills: 0 | Status: SHIPPED | OUTPATIENT
Start: 2020-02-04

## 2020-02-04 NOTE — PROGRESS NOTES
Chief Complaint   Patient presents with    Cough    Shortness of Breath     1. Have you been to the ER, urgent care clinic since your last visit? Hospitalized since your last visit? No    2. Have you seen or consulted any other health care providers outside of the [address] [place] [address] since your last visit? Include any pap smears or colon screening.  No

## 2020-02-04 NOTE — LETTER
2/4/2020 8:28 PM 
 
Ms. Guille Portillo [address] [place] [id number] Dear Guille Portillo: 
 
Please find your most recent results below. Resulted Orders XR CHEST PA LAT (Exam End: 2/4/2020 11:04 AM) Narrative Exam:  2 view chest 
 
Indication: Pneumonia cough and shortness of breath. COMPARISON: None PA and lateral views demonstrate normal heart size. There is no acute process in 
the lung fields. Minimal linear scarring in the right middle lobe is noted. No 
pneumonia. The osseous structures are unremarkable. Impression Impression: No acute process. RECOMMENDATIONS: 
Chest x-ray is normal. 
 
Please call me if you have any questions: [phone] Sincerely, Ashley Dixon MD

## 2020-02-05 NOTE — PROGRESS NOTES
[address] [place] [address] and Primary Care  [place] [address]  [address] [facility] [address]  Phone:  [phone]  Fax: [phone]       Chief Complaint   Patient presents with    Cough    Shortness of Breath   . SUBJECTIVE:    Jerome Bhatt is a 60 y.o. female Comes in for return visit stating that yesterday she developed upper respiratory symptoms. Since then she has been increasingly short of breath and has had audible wheezing. She has a known history of asthma. She does not smoke cigarettes. Her cough is currently nonproductive. She also has a history of impaired glucose tolerance predominantly. She has not had a hemoglobin A1c in the last eight months or so. There has been no meaningful weight loss since I last saw her. She remains morbidly obese. Current Outpatient Medications   Medication Sig Dispense Refill    predniSONE (DELTASONE) 20 mg tablet Take 20 mg by mouth three (3) times daily (after meals). 21 Tab 0    cefUROXime (CEFTIN) 500 mg tablet Take 1 Tab by mouth two (2) times a day. 14 Tab 0    albuterol (PROVENTIL HFA, VENTOLIN HFA, PROAIR HFA) 90 mcg/actuation inhaler INHALE 2 PUFFS BY MOUTH EVERY 4 HOURS AS NEEDED FOR WHEEZE 1 Inhaler 11    WIXELA INHUB 100-50 mcg/dose diskus inhaler TAKE 1 PUFF BY MOUTH TWICE A DAY *RINSE MOUTH AFTER USE* 1 Inhaler 11    loratadine (CLARITIN) 10 mg tablet Take 10 mg by mouth daily as needed for Allergies.  PARoxetine (PAXIL) 20 mg tablet Take 1 Tab by mouth nightly. 30 Tab 11    valACYclovir (VALTREX) 1 gram tablet Take 0.5 Tabs by mouth daily. 30 Tab 11    naproxen (NAPROSYN) 500 mg tablet Take 1 Tab by mouth two (2) times daily (with meals). 60 Tab 11    omeprazole (PRILOSEC) 40 mg capsule Take 1 Cap by mouth daily.  27 Cap 11     Past Medical History:   Diagnosis Date    Arthritis     Asthma      Past Surgical History:   Procedure Laterality Date    HX CHOLECYSTECTOMY      HX COLONOSCOPY  4- tubulovillous adenoma---Dr.Christine Small    HX GI      HX GYN      s/p myomectomy, HAY and BSO     Allergies   Allergen Reactions    Aspirin Other (comments)     Bronchospasm         REVIEW OF SYSTEMS:  General: negative for - chills or fever  ENT: negative for - headaches, nasal congestion or tinnitus  Respiratory: See HPI  Cardiovascular : negative for - chest pain, edema, palpitations or shortness of breath  Gastrointestinal: negative for - abdominal pain, blood in stools, heartburn or nausea/vomiting  Genito-Urinary: no dysuria, trouble voiding, or hematuria  Musculoskeletal: negative for - gait disturbance, joint pain, joint stiffness or joint swelling  Neurological: no TIA or stroke symptoms  Hematologic: no bruises, no bleeding, no swollen glands  Integument: no lumps, mole changes, nail changes or rash  Endocrine: no malaise/lethargy or unexpected weight changes      Social History     Socioeconomic History    Marital status: LEGALLY      Spouse name: Not on file    Number of children: 0    Years of education: Not on file    Highest education level: Not on file   Occupational History    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    Tobacco Use    Smoking status: Never Smoker    Smokeless tobacco: Never Used   Substance and Sexual Activity    Alcohol use: No    Drug use: No    Sexual activity: Yes     Family History   Problem Relation Age of Onset    Cancer Mother     Cancer Father         Prostate cancer    Diabetes Sister        OBJECTIVE:    Visit Vitals  BP (!) 147/93   Pulse (!) 109   Temp 99.3 °F (37.4 °C) (Oral)   Resp 20   Ht 5' 4\" (1.626 m)   Wt 262 lb 1.6 oz (118.9 kg)   SpO2 98%   BMI 44.99 kg/m²     CONSTITUTIONAL: well , well nourished, appears age appropriate  EYES: perrla, eom intact  ENMT:moist mucous membranes, pharynx clear  NECK: supple.  Thyroid normal  RESPIRATORY: Chest: Bilateral fine expiratory rhonchi  CARDIOVASCULAR: Heart: regular rate and rhythm  GASTROINTESTINAL: Abdomen: soft, bowel sounds active  HEMATOLOGIC: no pathological lymph nodes palpated  MUSCULOSKELETAL: Extremities: no edema, pulse 1+   INTEGUMENT: No unusual rashes or suspicious skin lesions noted. Nails appear normal.  NEUROLOGIC: non-focal exam   MENTAL STATUS: alert and oriented, appropriate affect      ASSESSMENT:  1. Upper respiratory tract infection, unspecified type    2. Moderate persistent reactive airway disease without complication    3. Pneumonitis    4. Controlled type 2 diabetes mellitus without complication, without long-term current use of insulin ([address]    5. Morbid obesity with BMI of 40.0-44.9, adult ([facility])    6. Reactive depression        PLAN:  (poor audio quality - skipping)    1. The patient has a URI complicated by significant reactive airway disease. There are a few basilar rales and chest x-ray will be obtained to make sure she does not have an existing pneumonitis. In the intervening time, I will give her prednisone 20 mg t.i.d. p.c., and she will continue use of her rescue inhaler, as well as her Advair. This should resolve fairly uneventfully. 2. ______________ on prednisone, she needs to have a hemoglobin A1c done. 3. I encouraged weight reduction. This can be accomplished by eating meals, eliminating snacks and avoiding the consumption of processed carbohydrates. 4. She does have a history of depression, but this is quite stable. She remains on her antidepressant. .  Orders Placed This Encounter    XR CHEST PA LAT    METABOLIC PANEL, BASIC    HEMOGLOBIN A1C WITH EAG    predniSONE (DELTASONE) 20 mg tablet    cefUROXime (CEFTIN) 500 mg tablet         Follow-up and Dispositions    · Return keep old apt.            Ok Matson MD

## 2020-02-17 ENCOUNTER — OFFICE VISIT (OUTPATIENT)
Dept: INTERNAL MEDICINE CLINIC | Age: 55
End: 2020-02-17

## 2020-02-17 VITALS
OXYGEN SATURATION: 99 % | HEIGHT: 64 IN | RESPIRATION RATE: 14 BRPM | BODY MASS INDEX: 43.98 KG/M2 | SYSTOLIC BLOOD PRESSURE: 124 MMHG | WEIGHT: 257.6 LBS | HEART RATE: 85 BPM | TEMPERATURE: 100.1 F | DIASTOLIC BLOOD PRESSURE: 73 MMHG

## 2020-02-17 DIAGNOSIS — J45.40 MODERATE PERSISTENT REACTIVE AIRWAY DISEASE WITHOUT COMPLICATION: ICD-10-CM

## 2020-02-17 DIAGNOSIS — E66.01 MORBID OBESITY WITH BMI OF 40.0-44.9, ADULT (HCC): ICD-10-CM

## 2020-02-17 DIAGNOSIS — J06.9 UPPER RESPIRATORY TRACT INFECTION, UNSPECIFIED TYPE: Primary | ICD-10-CM

## 2020-02-17 DIAGNOSIS — E11.9 CONTROLLED TYPE 2 DIABETES MELLITUS WITHOUT COMPLICATION, WITHOUT LONG-TERM CURRENT USE OF INSULIN (HCC): ICD-10-CM

## 2020-02-17 RX ORDER — PREDNISONE 20 MG/1
20 TABLET ORAL
Qty: 15 TAB | Refills: 0 | Status: SHIPPED | OUTPATIENT
Start: 2020-02-17 | End: 2020-12-23 | Stop reason: ALTCHOICE

## 2020-02-17 RX ORDER — PAROXETINE HYDROCHLORIDE 20 MG/1
20 TABLET, FILM COATED ORAL
Qty: 30 TAB | Refills: 11 | Status: SHIPPED | OUTPATIENT
Start: 2020-02-17 | End: 2021-03-18

## 2020-02-17 NOTE — PROGRESS NOTES
Chief Complaint   Patient presents with    Other     Patient states that she is still \"feeling bad\" since her last visit to the office. She states that she is wheezing and coughing. 1. Have you been to the ER, urgent care clinic since your last visit? Hospitalized since your last visit? No    2. Have you seen or consulted any other health care providers outside of the [address] [place] [address] since your last visit? Include any pap smears or colon screening. No         After giving it further thought the patient needs to have a chest x-ray done. The staff was advised to call the patient to have this done on Tuesday in addition to lab work.   Tara Kathleen

## 2020-02-17 NOTE — PROGRESS NOTES
[address] [facility] and Primary Care  [place] [address]  [address] [place] [address]  Phone:  [phone]  Fax: [phone]       Chief Complaint   Patient presents with    Other     Patient states that she is still \"feeling bad\" since her last visit to the office. She states that she is wheezing and coughing. .      SUBJECTIVE:    Isabel Van is a 60 y.o. female Comes in stating that she just does not feel well. She has nasal congestion and coughing. Cough is nonproductive currently. She thinks this is a continuation of what she had three weeks ago. She has had no chills. Current Outpatient Medications   Medication Sig Dispense Refill    predniSONE (DELTASONE) 20 mg tablet Take 20 mg by mouth three (3) times daily (after meals). 15 Tab 0    cefUROXime (CEFTIN) 500 mg tablet Take 1 Tab by mouth two (2) times a day. 14 Tab 0    albuterol (PROVENTIL HFA, VENTOLIN HFA, PROAIR HFA) 90 mcg/actuation inhaler INHALE 2 PUFFS BY MOUTH EVERY 4 HOURS AS NEEDED FOR WHEEZE 1 Inhaler 11    WIXELA INHUB 100-50 mcg/dose diskus inhaler TAKE 1 PUFF BY MOUTH TWICE A DAY *RINSE MOUTH AFTER USE* 1 Inhaler 11    loratadine (CLARITIN) 10 mg tablet Take 10 mg by mouth daily as needed for Allergies.  valACYclovir (VALTREX) 1 gram tablet Take 0.5 Tabs by mouth daily. 30 Tab 11    naproxen (NAPROSYN) 500 mg tablet Take 1 Tab by mouth two (2) times daily (with meals). 60 Tab 11    omeprazole (PRILOSEC) 40 mg capsule Take 1 Cap by mouth daily. 30 Cap 11    PARoxetine (PAXIL) 20 mg tablet Take 1 Tab by mouth nightly.  27 Tab 11     Past Medical History:   Diagnosis Date    Arthritis     Asthma      Past Surgical History:   Procedure Laterality Date    HX CHOLECYSTECTOMY      HX COLONOSCOPY  4-    tubulovillous adenoma---Dr.Christine Small    HX GI      HX GYN      s/p myomectomy, HAY and BSO     Allergies   Allergen Reactions    Aspirin Other (comments)     Bronchospasm REVIEW OF SYSTEMS:  General: negative for - chills or fever  ENT: negative for - headaches, nasal congestion or tinnitus  Respiratory: negative for - cough, hemoptysis, shortness of breath or wheezing  Cardiovascular : negative for - chest pain, edema, palpitations or shortness of breath  Gastrointestinal: negative for - abdominal pain, blood in stools, heartburn or nausea/vomiting  Genito-Urinary: no dysuria, trouble voiding, or hematuria  Musculoskeletal: negative for - gait disturbance, joint pain, joint stiffness or joint swelling  Neurological: no TIA or stroke symptoms  Hematologic: no bruises, no bleeding, no swollen glands  Integument: no lumps, mole changes, nail changes or rash  Endocrine: no malaise/lethargy or unexpected weight changes      Social History     Socioeconomic History    Marital status: LEGALLY      Spouse name: Not on file    Number of children: 0    Years of education: Not on file    Highest education level: Not on file   Occupational History    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    Tobacco Use    Smoking status: Never Smoker    Smokeless tobacco: Never Used   Substance and Sexual Activity    Alcohol use: No    Drug use: No    Sexual activity: Yes     Family History   Problem Relation Age of Onset    Cancer Mother     Cancer Father         Prostate cancer    Diabetes Sister        OBJECTIVE:    Visit Vitals  /73   Pulse 85   Temp 100.1 °F (37.8 °C) (Oral)   Resp 14   Ht 5' 4\" (1.626 m)   Wt 257 lb 9.6 oz (116.8 kg)   SpO2 99%   BMI 44.22 kg/m²     CONSTITUTIONAL: well , well nourished, appears age appropriate  EYES: perrla, eom intact  ENMT:moist mucous membranes, pharynx clear  NECK: supple.  Thyroid normal  RESPIRATORY: Chest: Mild bilateral fine expiratory rhonchi, no other adventitious sounds noted  CARDIOVASCULAR: Heart: regular rate and rhythm  GASTROINTESTINAL: Abdomen: soft, bowel sounds active  HEMATOLOGIC: no pathological lymph nodes palpated  MUSCULOSKELETAL: Extremities: no edema, pulse 1+   INTEGUMENT: No unusual rashes or suspicious skin lesions noted. Nails appear normal.  NEUROLOGIC: non-focal exam   MENTAL STATUS: alert and oriented, appropriate affect      ASSESSMENT:  1. Upper respiratory tract infection, unspecified type    2. Moderate persistent reactive airway disease without complication    3. Controlled type 2 diabetes mellitus without complication, without long-term current use of insulin ([facility] [address]    4. Morbid obesity with BMI of 40.0-44.9, adult ([facility] [address]        PLAN:    1. The patient has a URI. I explained to her that this is another infection, but not the same one she came in to see me with three weeks ago. I suggest Tylenol, Claritin-D 12 and continued use of her bronchodilators. Her asthma has indeed flared up slightly since the onset of this URI and I will give her another five day course of prednisone 20 mg t.i.d. p.c.  2. She does have a history of diabetes and thus far blood sugars have been reasonably stable. 3. Ideally she needs to lose weight. This can be accomplished by eating meals, eliminating snacks and avoiding the consumption of processed carbohydrates. .  Orders Placed This Encounter    HEMOGLOBIN A1C WITH EAG    METABOLIC PANEL, BASIC    CBC WITH AUTOMATED DIFF    predniSONE (DELTASONE) 20 mg tablet         Follow-up and Dispositions    · Return in about 4 weeks (around 3/16/2020).            Miriam Calle MD

## 2020-02-24 ENCOUNTER — CLINICAL SUPPORT (OUTPATIENT)
Dept: INTERNAL MEDICINE CLINIC | Age: 55
End: 2020-02-24

## 2020-02-24 DIAGNOSIS — J18.9 PNEUMONITIS: Primary | ICD-10-CM

## 2020-02-25 LAB
BUN SERPL-MCNC: 13 MG/DL (ref 6–24)
BUN/CREAT SERPL: 16 (ref 9–23)
CALCIUM SERPL-MCNC: 10.8 MG/DL (ref 8.7–10.2)
CHLORIDE SERPL-SCNC: 99 MMOL/L (ref 96–106)
CO2 SERPL-SCNC: 24 MMOL/L (ref 20–29)
CREAT SERPL-MCNC: 0.82 MG/DL (ref 0.57–1)
EST. AVERAGE GLUCOSE BLD GHB EST-MCNC: 146 MG/DL
GLUCOSE SERPL-MCNC: 91 MG/DL (ref 65–99)
HBA1C MFR BLD: 6.7 % (ref 4.8–5.6)
POTASSIUM SERPL-SCNC: 4.5 MMOL/L (ref 3.5–5.2)
SODIUM SERPL-SCNC: 141 MMOL/L (ref 134–144)

## 2020-04-07 ENCOUNTER — TELEPHONE (OUTPATIENT)
Dept: INTERNAL MEDICINE CLINIC | Age: 55
End: 2020-04-07

## 2020-04-08 ENCOUNTER — OFFICE VISIT (OUTPATIENT)
Dept: INTERNAL MEDICINE CLINIC | Age: 55
End: 2020-04-08

## 2020-04-08 VITALS
OXYGEN SATURATION: 96 % | HEIGHT: 64 IN | TEMPERATURE: 98.3 F | RESPIRATION RATE: 16 BRPM | SYSTOLIC BLOOD PRESSURE: 130 MMHG | WEIGHT: 263 LBS | HEART RATE: 77 BPM | BODY MASS INDEX: 44.9 KG/M2 | DIASTOLIC BLOOD PRESSURE: 80 MMHG

## 2020-04-08 DIAGNOSIS — Z00.00 PHYSICAL EXAM: ICD-10-CM

## 2020-04-08 DIAGNOSIS — E66.01 MORBID OBESITY WITH BMI OF 40.0-44.9, ADULT (HCC): ICD-10-CM

## 2020-04-08 DIAGNOSIS — E11.9 CONTROLLED TYPE 2 DIABETES MELLITUS WITHOUT COMPLICATION, WITHOUT LONG-TERM CURRENT USE OF INSULIN (HCC): ICD-10-CM

## 2020-04-08 DIAGNOSIS — F32.9 REACTIVE DEPRESSION: ICD-10-CM

## 2020-04-08 DIAGNOSIS — J45.20 MILD INTERMITTENT ASTHMA WITHOUT COMPLICATION: Primary | ICD-10-CM

## 2020-04-08 NOTE — PROGRESS NOTES
Chief Complaint   Patient presents with    Diabetes     3 month follow up        1. Have you been to the ER, urgent care clinic since your last visit? Hospitalized since your last visit? No    2. Have you seen or consulted any other health care providers outside of the [address] [place] [address] since your last visit? Include any pap smears or colon screening.  No

## 2020-04-09 LAB
ALBUMIN SERPL-MCNC: 4.8 G/DL (ref 3.8–4.9)
ALBUMIN/CREAT UR: 10 MG/G CREAT (ref 0–29)
ALBUMIN/GLOB SERPL: 2.5 {RATIO} (ref 1.2–2.2)
ALP SERPL-CCNC: 49 IU/L (ref 39–117)
ALT SERPL-CCNC: 17 IU/L (ref 0–32)
APO B SERPL-MCNC: 86 MG/DL
APPEARANCE UR: CLEAR
AST SERPL-CCNC: 20 IU/L (ref 0–40)
BACTERIA #/AREA URNS HPF: ABNORMAL /[HPF]
BASOPHILS # BLD AUTO: 0 X10E3/UL (ref 0–0.2)
BASOPHILS NFR BLD AUTO: 1 %
BILIRUB SERPL-MCNC: 0.2 MG/DL (ref 0–1.2)
BILIRUB UR QL STRIP: NEGATIVE
BUN SERPL-MCNC: 13 MG/DL (ref 6–24)
BUN/CREAT SERPL: 18 (ref 9–23)
CALCIUM SERPL-MCNC: 9.9 MG/DL (ref 8.7–10.2)
CASTS URNS QL MICRO: ABNORMAL /LPF
CHLORIDE SERPL-SCNC: 107 MMOL/L (ref 96–106)
CHOLEST SERPL-MCNC: 182 MG/DL (ref 100–199)
CO2 SERPL-SCNC: 22 MMOL/L (ref 20–29)
COLOR UR: YELLOW
CREAT SERPL-MCNC: 0.72 MG/DL (ref 0.57–1)
CREAT UR-MCNC: 295.2 MG/DL
EOSINOPHIL # BLD AUTO: 0.1 X10E3/UL (ref 0–0.4)
EOSINOPHIL NFR BLD AUTO: 1 %
EPI CELLS #/AREA URNS HPF: ABNORMAL /HPF (ref 0–10)
ERYTHROCYTE [DISTWIDTH] IN BLOOD BY AUTOMATED COUNT: 13.7 % (ref 11.7–15.4)
GLOBULIN SER CALC-MCNC: 1.9 G/DL (ref 1.5–4.5)
GLUCOSE SERPL-MCNC: 117 MG/DL (ref 65–99)
GLUCOSE UR QL: NEGATIVE
HCT VFR BLD AUTO: 36.6 % (ref 34–46.6)
HDLC SERPL-MCNC: 67 MG/DL
HGB BLD-MCNC: 11.9 G/DL (ref 11.1–15.9)
HGB UR QL STRIP: NEGATIVE
IMM GRANULOCYTES # BLD AUTO: 0 X10E3/UL (ref 0–0.1)
IMM GRANULOCYTES NFR BLD AUTO: 1 %
KETONES UR QL STRIP: NEGATIVE
LDLC SERPL CALC-MCNC: 105 MG/DL (ref 0–99)
LEUKOCYTE ESTERASE UR QL STRIP: ABNORMAL
LYMPHOCYTES # BLD AUTO: 2.1 X10E3/UL (ref 0.7–3.1)
LYMPHOCYTES NFR BLD AUTO: 43 %
MCH RBC QN AUTO: 27.2 PG (ref 26.6–33)
MCHC RBC AUTO-ENTMCNC: 32.5 G/DL (ref 31.5–35.7)
MCV RBC AUTO: 84 FL (ref 79–97)
MICRO URNS: ABNORMAL
MICROALBUMIN UR-MCNC: 28.1 UG/ML
MONOCYTES # BLD AUTO: 0.5 X10E3/UL (ref 0.1–0.9)
MONOCYTES NFR BLD AUTO: 10 %
MUCOUS THREADS URNS QL MICRO: PRESENT
NEUTROPHILS # BLD AUTO: 2.2 X10E3/UL (ref 1.4–7)
NEUTROPHILS NFR BLD AUTO: 44 %
NITRITE UR QL STRIP: NEGATIVE
PH UR STRIP: 6 [PH] (ref 5–7.5)
PLATELET # BLD AUTO: 235 X10E3/UL (ref 150–450)
POTASSIUM SERPL-SCNC: 4.4 MMOL/L (ref 3.5–5.2)
PROT SERPL-MCNC: 6.7 G/DL (ref 6–8.5)
PROT UR QL STRIP: ABNORMAL
RBC # BLD AUTO: 4.38 X10E6/UL (ref 3.77–5.28)
RBC #/AREA URNS HPF: ABNORMAL /HPF (ref 0–2)
SODIUM SERPL-SCNC: 143 MMOL/L (ref 134–144)
SP GR UR: >=1.03 (ref 1–1.03)
TRIGL SERPL-MCNC: 48 MG/DL (ref 0–149)
TSH SERPL DL<=0.005 MIU/L-ACNC: 1.51 UIU/ML (ref 0.45–4.5)
UROBILINOGEN UR STRIP-MCNC: 0.2 MG/DL (ref 0.2–1)
VLDLC SERPL CALC-MCNC: 10 MG/DL (ref 5–40)
WBC # BLD AUTO: 4.9 X10E3/UL (ref 3.4–10.8)
WBC #/AREA URNS HPF: ABNORMAL /HPF (ref 0–5)

## 2020-04-09 NOTE — PROGRESS NOTES
[address] [place] [address] and Primary Care  [place] [address]  [address] [place] [address]  Phone:  [phone]  Fax: [phone]       Chief Complaint   Patient presents with    Diabetes     3 month follow up    . SUBJECTIVE:    Maxwell Valles is a 60 y.o. female comes in for return visit stating that she has done reasonably well. Her asthma has been somewhat stable and she uses her MDI on a regular basis, particularly her Advair. There has been no meaningful weight loss since I last saw her. She has a past medical history of depression, on Paxil. She is quite stable. She is working from home currently because of the crisis. Current Outpatient Medications   Medication Sig Dispense Refill    PARoxetine (PAXIL) 20 mg tablet Take 1 Tab by mouth nightly. 30 Tab 11    predniSONE (DELTASONE) 20 mg tablet Take 20 mg by mouth three (3) times daily (after meals). 15 Tab 0    cefUROXime (CEFTIN) 500 mg tablet Take 1 Tab by mouth two (2) times a day. 14 Tab 0    albuterol (PROVENTIL HFA, VENTOLIN HFA, PROAIR HFA) 90 mcg/actuation inhaler INHALE 2 PUFFS BY MOUTH EVERY 4 HOURS AS NEEDED FOR WHEEZE 1 Inhaler 11    WIXELA INHUB 100-50 mcg/dose diskus inhaler TAKE 1 PUFF BY MOUTH TWICE A DAY *RINSE MOUTH AFTER USE* 1 Inhaler 11    loratadine (CLARITIN) 10 mg tablet Take 10 mg by mouth daily as needed for Allergies.  valACYclovir (VALTREX) 1 gram tablet Take 0.5 Tabs by mouth daily. 30 Tab 11    naproxen (NAPROSYN) 500 mg tablet Take 1 Tab by mouth two (2) times daily (with meals). 60 Tab 11    omeprazole (PRILOSEC) 40 mg capsule Take 1 Cap by mouth daily.  30 Cap 11     Past Medical History:   Diagnosis Date    Arthritis     Asthma      Past Surgical History:   Procedure Laterality Date    HX CHOLECYSTECTOMY      HX COLONOSCOPY  4-    tubulovillous adenoma---Dr.Christine Small    HX GI      HX GYN      s/p myomectomy, HAY and BSO     Allergies   Allergen Reactions  Aspirin Other (comments)     Bronchospasm         REVIEW OF SYSTEMS:  General: negative for - chills or fever  ENT: negative for - headaches, nasal congestion or tinnitus  Respiratory: negative for - cough, hemoptysis, shortness of breath or wheezing  Cardiovascular : negative for - chest pain, edema, palpitations or shortness of breath  Gastrointestinal: negative for - abdominal pain, blood in stools, heartburn or nausea/vomiting  Genito-Urinary: no dysuria, trouble voiding, or hematuria  Musculoskeletal: negative for - gait disturbance, joint pain, joint stiffness or joint swelling  Neurological: no TIA or stroke symptoms  Hematologic: no bruises, no bleeding, no swollen glands  Integument: no lumps, mole changes, nail changes or rash  Endocrine: no malaise/lethargy or unexpected weight changes      Social History     Socioeconomic History    Marital status: LEGALLY      Spouse name: Not on file    Number of children: 0    Years of education: Not on file    Highest education level: Not on file   Occupational History    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    Tobacco Use    Smoking status: Never Smoker    Smokeless tobacco: Never Used   Substance and Sexual Activity    Alcohol use: No    Drug use: No    Sexual activity: Yes     Family History   Problem Relation Age of Onset    Cancer Mother     Cancer Father         Prostate cancer    Diabetes Sister        OBJECTIVE:    Visit Vitals  /80   Pulse 77   Temp 98.3 °F (36.8 °C) (Oral)   Resp 16   Ht 5' 4\" (1.626 m)   Wt 263 lb (119.3 kg)   SpO2 96%   BMI 45.14 kg/m²     CONSTITUTIONAL: well , well nourished, appears age appropriate  EYES: perrla, eom intact  ENMT:moist mucous membranes, pharynx clear  NECK: supple.  Thyroid normal  RESPIRATORY: Chest: clear to ascultation and percussion   CARDIOVASCULAR: Heart: regular rate and rhythm  GASTROINTESTINAL: Abdomen: soft, bowel sounds active  HEMATOLOGIC: no pathological lymph nodes palpated  MUSCULOSKELETAL: Extremities: no edema, pulse 1+   INTEGUMENT: No unusual rashes or suspicious skin lesions noted. Nails appear normal.  NEUROLOGIC: non-focal exam   MENTAL STATUS: alert and oriented, appropriate affect      ASSESSMENT:  1. Mild intermittent asthma without complication    2. Controlled type 2 diabetes mellitus without complication, without long-term current use of insulin ([facility])    3. Reactive depression    4. Morbid obesity with BMI of 40.0-44.9, adult ([facility]    5. Physical exam        PLAN:    1. Asthma appears to be stable. I remind her of the importance of using her maintenance inhaler on a regular basis. 2. She is indeed a diabetic and I will await the results of her hemoglobin A1c. I remind her that the most important thing she can do is lose weight to prevent potential progression. 3. Her depression is quite stable. She will continue her antidepressant as prescribed. 4. She does need to lose weight. I remind her of the importance of eating meals, eliminating snacks and avoiding the consumption of processed carbohydrates. .  Orders Placed This Encounter    MICROALBUMIN, UR, RAND    APOLIPOPROTEIN B    CBC WITH AUTOMATED DIFF    LIPID PANEL    METABOLIC PANEL, COMPREHENSIVE    TSH 3RD GENERATION    URINALYSIS W/ RFLX MICROSCOPIC         Follow-up and Dispositions    · Return in about 4 months (around 8/8/2020).            Oz Walters MD

## 2020-04-22 ENCOUNTER — CLINICAL SUPPORT (OUTPATIENT)
Dept: INTERNAL MEDICINE CLINIC | Age: 55
End: 2020-04-22

## 2020-04-22 DIAGNOSIS — Z00.00 PHYSICAL EXAM: Primary | ICD-10-CM

## 2020-04-24 LAB — BACTERIA UR CULT: NO GROWTH

## 2020-06-22 ENCOUNTER — TELEPHONE (OUTPATIENT)
Dept: PHARMACY | Age: 55
End: 2020-06-22

## 2020-06-22 NOTE — TELEPHONE ENCOUNTER
CLINICAL PHARMACY CONSULT: ASTHMA INHALER REVIEW    SUBJECTIVE:   Maxwell Valles is a 60 y.o. female Identified as an Asthma care gap for [place]: high medication possession ratio of rescue to maintenance inhalers. OBJECTIVE:  Allergies   Allergen Reactions    Aspirin Other (comments)     Bronchospasm       Medications per current medication list:  Current Outpatient Medications   Medication Sig Dispense Refill    albuterol (PROVENTIL VENTOLIN) 2.5 mg /3 mL (0.083 %) nebu 3 mL by Nebulization route every four (4) hours as needed for Wheezing. 50 Nebule 11    PARoxetine (PAXIL) 20 mg tablet Take 1 Tab by mouth nightly. 30 Tab 11    predniSONE (DELTASONE) 20 mg tablet Take 20 mg by mouth three (3) times daily (after meals). 15 Tab 0    cefUROXime (CEFTIN) 500 mg tablet Take 1 Tab by mouth two (2) times a day. 14 Tab 0    albuterol (PROVENTIL HFA, VENTOLIN HFA, PROAIR HFA) 90 mcg/actuation inhaler INHALE 2 PUFFS BY MOUTH EVERY 4 HOURS AS NEEDED FOR WHEEZE 1 Inhaler 11    WIXELA INHUB 100-50 mcg/dose diskus inhaler TAKE 1 PUFF BY MOUTH TWICE A DAY *RINSE MOUTH AFTER USE* 1 Inhaler 11    loratadine (CLARITIN) 10 mg tablet Take 10 mg by mouth daily as needed for Allergies.  valACYclovir (VALTREX) 1 gram tablet Take 0.5 Tabs by mouth daily. 30 Tab 11    naproxen (NAPROSYN) 500 mg tablet Take 1 Tab by mouth two (2) times daily (with meals). 60 Tab 11    omeprazole (PRILOSEC) 40 mg capsule Take 1 Cap by mouth daily. 30 Cap 11     Social History:   Social History     Tobacco Use    Smoking status: Never Smoker    Smokeless tobacco: Never Used   Substance Use Topics    Alcohol use: No     Immunizations: There is no immunization history on file for this patient.     ASSESSMENT:  · Current Prescribed Inhalers:  · Rescue: albuterol MDI & nebs - using neb one time every day as a precaution  · Maintenance: Advair 100-50mcg - sts using @one time daily (forgets PM dose)  · Other Medications: loratadine daily  · Inhaler technique/medication adherence: reviewed  · Medication cost: denies concern  · Spirometry/PFT on file: none found  · Pneumonia vaccine: appears due for Pneumovax  · Smoking status: none  · Exacerbations requiring oral steroids: prednisone last in Feb  · Is patient on non-selective beta blocker or NSAIDS?  no   · Triggers/Environmental changes: states since she has been staying inside, has been helpful in avoiding triggers  · Asthma Control Test (completed in flowsheet), total score: 19  · Other potential [place]-reported care gaps (statin adherence/SUPD/SPC): appears diet-controlled DM, did not discuss statin at this time with patient    PLAN:   Encouraged adherence to Advair BID  o Discussed reminder by placing in a different place after AM dose or a sticky note by toothbrush (or something else she does every evening)  o States she has sufficient supply, so declines outreach to her pharmacy to refill at this time - appears rx from October was sent with 1 year of refills  o Briefly discussed that [place] or fluticasome-salmeterol (Advair \"generics\") often less expensive, and patient does think it was affordable for her   Discussed Pneumovax  o States her allergist told her to avoid flu vaccine \"because it always made me so ill\"  o Encouraged her to discuss/ask if she may benefit from Pneumovax - states she'll discuss at her Aug PCP visit   Briefly discussed COVID  precautions    Jailyn Ness, [place], [facility]  Direct: [phone]  Department, toll free: [phone], option 7      =========================================================   For Pharmacy Admin Tracking Only    PHSO: PHSO Patient?: Yes  Total # of Interventions Recommended: Count: 2  - Increased Dose #: 1  - Maintenance Safety Lab Monitoring #: 1  Recommended intervention potential cost savings: 1  Total Interventions Accepted: 1  Time Spent (min): 25

## 2020-07-21 RX ORDER — NAPROXEN 500 MG/1
500 TABLET ORAL 2 TIMES DAILY WITH MEALS
Qty: 60 TAB | Refills: 11 | Status: SHIPPED | OUTPATIENT
Start: 2020-07-21 | End: 2021-07-25

## 2020-08-10 ENCOUNTER — OFFICE VISIT (OUTPATIENT)
Dept: INTERNAL MEDICINE CLINIC | Age: 55
End: 2020-08-10
Payer: COMMERCIAL

## 2020-08-10 VITALS
HEART RATE: 82 BPM | SYSTOLIC BLOOD PRESSURE: 139 MMHG | DIASTOLIC BLOOD PRESSURE: 81 MMHG | TEMPERATURE: 98.6 F | WEIGHT: 277.2 LBS | HEIGHT: 64 IN | RESPIRATION RATE: 16 BRPM | OXYGEN SATURATION: 98 % | BODY MASS INDEX: 47.33 KG/M2

## 2020-08-10 DIAGNOSIS — E11.9 CONTROLLED TYPE 2 DIABETES MELLITUS WITHOUT COMPLICATION, WITHOUT LONG-TERM CURRENT USE OF INSULIN (HCC): ICD-10-CM

## 2020-08-10 DIAGNOSIS — E66.01 MORBID OBESITY WITH BMI OF 40.0-44.9, ADULT (HCC): Primary | ICD-10-CM

## 2020-08-10 DIAGNOSIS — J45.20 MILD INTERMITTENT REACTIVE AIRWAY DISEASE WITHOUT COMPLICATION: ICD-10-CM

## 2020-08-10 DIAGNOSIS — I87.2 VENOUS INSUFFICIENCY: ICD-10-CM

## 2020-08-10 DIAGNOSIS — R53.81 PHYSICAL DECONDITIONING: ICD-10-CM

## 2020-08-10 PROCEDURE — 99215 OFFICE O/P EST HI 40 MIN: CPT | Performed by: INTERNAL MEDICINE

## 2020-08-10 RX ORDER — FLUTICASONE PROPIONATE AND SALMETEROL 100; 50 UG/1; UG/1
POWDER RESPIRATORY (INHALATION)
Qty: 1 INHALER | Refills: 11 | Status: SHIPPED | OUTPATIENT
Start: 2020-08-10 | End: 2021-07-21

## 2020-08-10 NOTE — PROGRESS NOTES
Chief Complaint   Patient presents with    Diabetes     4 month follow up      1. Have you been to the ER, urgent care clinic since your last visit? Hospitalized since your last visit? No    2. Have you seen or consulted any other health care providers outside of the [address] [place] [address] since your last visit? Include any pap smears or colon screening.  No

## 2020-08-10 NOTE — PROGRESS NOTES
[address] [place] [address] and Primary Care  [place] [address]  [address] [place] [address]  Phone:  [phone]  Fax: [phone]       Chief Complaint   Patient presents with    Diabetes     4 month follow up    . SUBJECTIVE:    Inna Burr is a 60 y.o. female Comes in for return visit, stating that she is doing reasonably well. Unfortunately, she has been eating excessively during the time of the pandemic and has gained weight. She continues to work on a regular basis. She has no pain in her knees or hips. She has not had any further bronchospasm. She has a past history of depression, above and beyond her existing morbid obesity. Current Outpatient Medications   Medication Sig Dispense Refill    fluticasone propion-salmeteroL (Wixela Inhub) 100-50 mcg/dose diskus inhaler TAKE 1 PUFF BY MOUTH TWICE A DAY *RINSE MOUTH AFTER USE* 1 Inhaler 11    naproxen (NAPROSYN) 500 mg tablet Take 1 Tab by mouth two (2) times daily (with meals). 60 Tab 11    albuterol (PROVENTIL VENTOLIN) 2.5 mg /3 mL (0.083 %) nebu 3 mL by Nebulization route every four (4) hours as needed for Wheezing. 50 Nebule 11    PARoxetine (PAXIL) 20 mg tablet Take 1 Tab by mouth nightly. 30 Tab 11    predniSONE (DELTASONE) 20 mg tablet Take 20 mg by mouth three (3) times daily (after meals). 15 Tab 0    cefUROXime (CEFTIN) 500 mg tablet Take 1 Tab by mouth two (2) times a day. 14 Tab 0    albuterol (PROVENTIL HFA, VENTOLIN HFA, PROAIR HFA) 90 mcg/actuation inhaler INHALE 2 PUFFS BY MOUTH EVERY 4 HOURS AS NEEDED FOR WHEEZE 1 Inhaler 11    loratadine (CLARITIN) 10 mg tablet Take 10 mg by mouth daily as needed for Allergies.  valACYclovir (VALTREX) 1 gram tablet Take 0.5 Tabs by mouth daily. 30 Tab 11    omeprazole (PRILOSEC) 40 mg capsule Take 1 Cap by mouth daily.  30 Cap 11     Past Medical History:   Diagnosis Date    Arthritis     Asthma      Past Surgical History:   Procedure Laterality Date    HX CHOLECYSTECTOMY      HX COLONOSCOPY  4-    tubulovillous adenoma---Dr.Christine Small    HX GI      HX GYN      s/p myomectomy, HAY and BSO     Allergies   Allergen Reactions    Aspirin Other (comments)     Bronchospasm         REVIEW OF SYSTEMS:  General: negative for - chills or fever  ENT: negative for - headaches, nasal congestion or tinnitus  Respiratory: negative for - cough, hemoptysis, shortness of breath or wheezing  Cardiovascular : negative for - chest pain, edema, palpitations or shortness of breath  Gastrointestinal: negative for - abdominal pain, blood in stools, heartburn or nausea/vomiting  Genito-Urinary: no dysuria, trouble voiding, or hematuria  Musculoskeletal: negative for - gait disturbance, joint pain, joint stiffness or joint swelling  Neurological: no TIA or stroke symptoms  Hematologic: no bruises, no bleeding, no swollen glands  Integument: no lumps, mole changes, nail changes or rash  Endocrine: no malaise/lethargy or unexpected weight changes      Social History     Socioeconomic History    Marital status: LEGALLY      Spouse name: Not on file    Number of children: 0    Years of education: Not on file    Highest education level: Not on file   Occupational History    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    Tobacco Use    Smoking status: Never Smoker    Smokeless tobacco: Never Used   Substance and Sexual Activity    Alcohol use: No    Drug use: No    Sexual activity: Yes     Family History   Problem Relation Age of Onset    Cancer Mother     Cancer Father         Prostate cancer    Diabetes Sister        OBJECTIVE:    Visit Vitals  /81   Pulse 82   Temp 98.6 °F (37 °C) (Oral)   Resp 16   Ht 5' 4\" (1.626 m)   Wt 277 lb 3.2 oz (125.7 kg)   SpO2 98%   BMI 47.58 kg/m²     CONSTITUTIONAL: well , well nourished, appears age appropriate  EYES: perrla, eom intact  ENMT:moist mucous membranes, pharynx clear  NECK: supple. Thyroid normal  RESPIRATORY: Chest: clear to ascultation and percussion   CARDIOVASCULAR: Heart: regular rate and rhythm  GASTROINTESTINAL: Abdomen: soft, bowel sounds active  HEMATOLOGIC: no pathological lymph nodes palpated  MUSCULOSKELETAL: Extremities: no edema, pulse 1+   INTEGUMENT: No unusual rashes or suspicious skin lesions noted. Nails appear normal.  NEUROLOGIC: non-focal exam   MENTAL STATUS: alert and oriented, appropriate affect      ASSESSMENT:  1. Morbid obesity with BMI of 40.0-44.9, adult ([facility] [address]    2. Venous insufficiency    3. Mild intermittent reactive airway disease without complication    4. Controlled type 2 diabetes mellitus without complication, without long-term current use of insulin ([facility] [address]    5. Physical deconditioning        PLAN:    1. The patient needs to lose weight. We have discussed this at length. Unfortunately, she will have to follow more specifically a reduction in her processed carbohydrates, which is probably the most important thing she can do. I also emphasized the need to eat meals and eliminate snacking. 2. She does have edema of her lower extremities, which is orthostatic in nature. This is related to her venous insufficiency aggravated by her morbid obesity and hot, humid weather. 3. Her reactive airway disease is doing reasonably well. She needs to be on a maintenance inhaler. I will therefore give her Advair at one puff b.i.d., along with continued prn use of her rescue inhaler. 4. She needs to increase her physical activity. She is deconditioned currently. I emphasized the importance of walking outside most days of the week. .  Orders Placed This Encounter    HEMOGLOBIN A1C WITH EAG    METABOLIC PANEL, BASIC    fluticasone propion-salmeteroL (Wixela Inhub) 100-50 mcg/dose diskus inhaler         Follow-up and Dispositions    · Return in about 4 months (around 12/10/2020).            Devika Jamil MD

## 2020-08-11 LAB
BUN SERPL-MCNC: 10 MG/DL (ref 6–24)
BUN/CREAT SERPL: 13 (ref 9–23)
CALCIUM SERPL-MCNC: 10 MG/DL (ref 8.7–10.2)
CHLORIDE SERPL-SCNC: 105 MMOL/L (ref 96–106)
CO2 SERPL-SCNC: 24 MMOL/L (ref 20–29)
CREAT SERPL-MCNC: 0.76 MG/DL (ref 0.57–1)
EST. AVERAGE GLUCOSE BLD GHB EST-MCNC: 140 MG/DL
GLUCOSE SERPL-MCNC: 116 MG/DL (ref 65–99)
HBA1C MFR BLD: 6.5 % (ref 4.8–5.6)
POTASSIUM SERPL-SCNC: 4.1 MMOL/L (ref 3.5–5.2)
SODIUM SERPL-SCNC: 143 MMOL/L (ref 134–144)

## 2020-12-23 ENCOUNTER — OFFICE VISIT (OUTPATIENT)
Dept: INTERNAL MEDICINE CLINIC | Age: 55
End: 2020-12-23
Payer: COMMERCIAL

## 2020-12-23 VITALS
HEART RATE: 98 BPM | DIASTOLIC BLOOD PRESSURE: 84 MMHG | TEMPERATURE: 98.2 F | RESPIRATION RATE: 20 BRPM | BODY MASS INDEX: 48.14 KG/M2 | WEIGHT: 282 LBS | SYSTOLIC BLOOD PRESSURE: 136 MMHG | OXYGEN SATURATION: 97 % | HEIGHT: 64 IN

## 2020-12-23 DIAGNOSIS — J45.20 MILD INTERMITTENT ASTHMA WITHOUT COMPLICATION: ICD-10-CM

## 2020-12-23 DIAGNOSIS — E66.01 MORBID OBESITY WITH BMI OF 40.0-44.9, ADULT (HCC): ICD-10-CM

## 2020-12-23 DIAGNOSIS — G25.81 RESTLESS LEG SYNDROME: Primary | ICD-10-CM

## 2020-12-23 DIAGNOSIS — F32.9 REACTIVE DEPRESSION: ICD-10-CM

## 2020-12-23 DIAGNOSIS — E11.9 CONTROLLED TYPE 2 DIABETES MELLITUS WITHOUT COMPLICATION, WITHOUT LONG-TERM CURRENT USE OF INSULIN (HCC): ICD-10-CM

## 2020-12-23 DIAGNOSIS — I87.2 VENOUS INSUFFICIENCY: ICD-10-CM

## 2020-12-23 PROCEDURE — 99215 OFFICE O/P EST HI 40 MIN: CPT | Performed by: INTERNAL MEDICINE

## 2020-12-23 NOTE — PROGRESS NOTES
Chief Complaint   Patient presents with    Diabetes     4 month follow up            1. Have you been to the ER, urgent care clinic since your last visit? Hospitalized since your last visit? No    2. Have you seen or consulted any other health care providers outside of the [address] [place] [address] since your last visit? Include any pap smears or colon screening.  No

## 2020-12-24 LAB
EST. AVERAGE GLUCOSE BLD GHB EST-MCNC: 154 MG/DL
HBA1C MFR BLD: 7 % (ref 4–5.6)

## 2020-12-24 RX ORDER — METFORMIN HYDROCHLORIDE 500 MG/1
500 TABLET, EXTENDED RELEASE ORAL
Qty: 30 TAB | Refills: 11 | Status: SHIPPED | OUTPATIENT
Start: 2020-12-24 | End: 2021-08-08 | Stop reason: SDUPTHER

## 2020-12-24 NOTE — PROGRESS NOTES
[address] [place] [address] and Primary Care  [facility]  [address] [place] [address]  Phone:  [phone]  Fax: [phone]       Chief Complaint   Patient presents with    Diabetes     4 month follow up    . SUBJECTIVE:    Venkatesh Venegas is a 60 y.o. female Comes in for return visit stating that she has done well. Unfortunately, she has gained about 5 pounds since I last saw her, primarily related to the pandemic. She works from home. She has a past history of diabetes, chronic venous insufficiency, asthma and depression. Current Outpatient Medications   Medication Sig Dispense Refill    fluticasone propion-salmeteroL (Wixela Inhub) 100-50 mcg/dose diskus inhaler TAKE 1 PUFF BY MOUTH TWICE A DAY *RINSE MOUTH AFTER USE* 1 Inhaler 11    naproxen (NAPROSYN) 500 mg tablet Take 1 Tab by mouth two (2) times daily (with meals). 60 Tab 11    albuterol (PROVENTIL VENTOLIN) 2.5 mg /3 mL (0.083 %) nebu 3 mL by Nebulization route every four (4) hours as needed for Wheezing. 50 Nebule 11    PARoxetine (PAXIL) 20 mg tablet Take 1 Tab by mouth nightly. 30 Tab 11    albuterol (PROVENTIL HFA, VENTOLIN HFA, PROAIR HFA) 90 mcg/actuation inhaler INHALE 2 PUFFS BY MOUTH EVERY 4 HOURS AS NEEDED FOR WHEEZE 1 Inhaler 11    loratadine (CLARITIN) 10 mg tablet Take 10 mg by mouth daily as needed for Allergies.  valACYclovir (VALTREX) 1 gram tablet Take 0.5 Tabs by mouth daily. 30 Tab 11    omeprazole (PRILOSEC) 40 mg capsule Take 1 Cap by mouth daily. 30 Cap 11    cefUROXime (CEFTIN) 500 mg tablet Take 1 Tab by mouth two (2) times a day.  14 Tab 0     Past Medical History:   Diagnosis Date    Arthritis     Asthma      Past Surgical History:   Procedure Laterality Date    HX CHOLECYSTECTOMY      HX COLONOSCOPY  4-    tubulovillous adenoma---Dr.Christine Small    HX GI      HX GYN      s/p myomectomy, HAY and BSO     Allergies   Allergen Reactions    Aspirin Other (comments) Bronchospasm         REVIEW OF SYSTEMS:  General: negative for - chills or fever  ENT: negative for - headaches, nasal congestion or tinnitus  Respiratory: negative for - cough, hemoptysis, shortness of breath or wheezing  Cardiovascular : negative for - chest pain, edema, palpitations or shortness of breath  Gastrointestinal: negative for - abdominal pain, blood in stools, heartburn or nausea/vomiting  Genito-Urinary: no dysuria, trouble voiding, or hematuria  Musculoskeletal: negative for - gait disturbance, joint pain, joint stiffness or joint swelling  Neurological: no TIA or stroke symptoms  Hematologic: no bruises, no bleeding, no swollen glands  Integument: no lumps, mole changes, nail changes or rash  Endocrine: no malaise/lethargy or unexpected weight changes      Social History     Socioeconomic History    Marital status: LEGALLY      Spouse name: Not on file    Number of children: 0    Years of education: Not on file    Highest education level: Not on file   Occupational History    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    Tobacco Use    Smoking status: Never Smoker    Smokeless tobacco: Never Used   Substance and Sexual Activity    Alcohol use: No    Drug use: No    Sexual activity: Yes     Family History   Problem Relation Age of Onset    Cancer Mother     Cancer Father         Prostate cancer    Diabetes Sister        OBJECTIVE:    Visit Vitals  /84   Pulse 98   Temp 98.2 °F (36.8 °C) (Oral)   Resp 20   Ht 5' 4\" (1.626 m)   Wt 282 lb (127.9 kg)   SpO2 97%   BMI 48.41 kg/m²     CONSTITUTIONAL: well , well nourished, appears age appropriate  EYES: perrla, eom intact  ENMT:moist mucous membranes, pharynx clear  NECK: supple.  Thyroid normal  RESPIRATORY: Chest: clear to ascultation and percussion   CARDIOVASCULAR: Heart: regular rate and rhythm  GASTROINTESTINAL: Abdomen: soft, bowel sounds active  HEMATOLOGIC: no pathological lymph nodes palpated  MUSCULOSKELETAL: Extremities: no edema, pulse 1+   INTEGUMENT: No unusual rashes or suspicious skin lesions noted. Nails appear normal.  NEUROLOGIC: non-focal exam   MENTAL STATUS: alert and oriented, appropriate affect      ASSESSMENT:  1. Restless leg syndrome    2. Controlled type 2 diabetes mellitus without complication, without long-term current use of insulin (H[facility])    3. Venous insufficiency    4. Mild intermittent asthma without complication    5. Reactive depression    6. Morbid obesity with BMI of 40.0-44.9, adult ([facility]        PLAN:    1. She has restless leg syndrome. I will treat her with Requip 1 mg q.h.s. prn.  2. Her diabetes hopefully is doing well, but I will await results of her hemoglobin A1c.  3. Her venous insufficiency is reasonably stable, as would be expected given the cold weather. 4. Her reactive airway disease is currently quiescent. She has not had a URI, which is the general stimulant for the development of her asthma. 5. Her depression is stable. She remains on her antidepressant. She is quite upbeat today. 6. She really needs to lose weight. This can be accomplished by eating meals, eliminating snacks and avoiding the consumption of processed carbohydrates. .  Orders Placed This Encounter    HEMOGLOBIN A1C WITH EAG         Follow-up and Dispositions    · Return in about 4 months (around 4/23/2021).            Kentrell Merchant MD

## 2021-03-18 RX ORDER — PAROXETINE HYDROCHLORIDE 20 MG/1
TABLET, FILM COATED ORAL
Qty: 30 TAB | Refills: 11 | Status: SHIPPED | OUTPATIENT
Start: 2021-03-18 | End: 2022-04-15

## 2021-05-11 ENCOUNTER — OFFICE VISIT (OUTPATIENT)
Dept: INTERNAL MEDICINE CLINIC | Age: 56
End: 2021-05-11
Payer: COMMERCIAL

## 2021-05-11 DIAGNOSIS — R43.2 DYSGEUSIA: ICD-10-CM

## 2021-05-11 DIAGNOSIS — U07.1 COVID-19 VIRUS INFECTION: ICD-10-CM

## 2021-05-11 DIAGNOSIS — R53.81 PHYSICAL DECONDITIONING: ICD-10-CM

## 2021-05-11 DIAGNOSIS — Z00.00 PHYSICAL EXAM: ICD-10-CM

## 2021-05-11 DIAGNOSIS — J45.20 MILD INTERMITTENT REACTIVE AIRWAY DISEASE WITHOUT COMPLICATION: ICD-10-CM

## 2021-05-11 DIAGNOSIS — E11.9 CONTROLLED TYPE 2 DIABETES MELLITUS WITHOUT COMPLICATION, WITHOUT LONG-TERM CURRENT USE OF INSULIN (HCC): ICD-10-CM

## 2021-05-11 DIAGNOSIS — R43.0 ANOSMIA: ICD-10-CM

## 2021-05-11 DIAGNOSIS — E66.01 MORBID OBESITY WITH BMI OF 40.0-44.9, ADULT (HCC): ICD-10-CM

## 2021-05-11 DIAGNOSIS — I87.2 VENOUS INSUFFICIENCY: Primary | ICD-10-CM

## 2021-05-11 PROCEDURE — 99214 OFFICE O/P EST MOD 30 MIN: CPT | Performed by: INTERNAL MEDICINE

## 2021-05-11 PROCEDURE — 99396 PREV VISIT EST AGE 40-64: CPT | Performed by: INTERNAL MEDICINE

## 2021-05-11 NOTE — PROGRESS NOTES
Chief Complaint   Patient presents with    Complete Physical    Leg Pain    Wrist Pain     1. Have you been to the ER, urgent care clinic since your last visit? Hospitalized since your last visit? No    2. Have you seen or consulted any other health care providers outside of the [address] [place] [address] since your last visit? Include any pap smears or colon screening.  No

## 2021-05-16 VITALS
RESPIRATION RATE: 16 BRPM | HEIGHT: 64 IN | SYSTOLIC BLOOD PRESSURE: 140 MMHG | TEMPERATURE: 98.6 F | WEIGHT: 283.5 LBS | BODY MASS INDEX: 48.4 KG/M2 | DIASTOLIC BLOOD PRESSURE: 86 MMHG | OXYGEN SATURATION: 100 % | HEART RATE: 73 BPM

## 2021-05-16 NOTE — PROGRESS NOTES
[address] [place] [address] and Primary Care  [place] [address]  [address] [place] [address]  Phone:  [phone]  Fax: [phone]       Chief Complaint   Patient presents with    Complete Physical     Patient is here for a wellness visit.  Leg Pain    Wrist Pain   . SUBJECTIVE:    Trevor Santos is a 60 y.o. female comes in stating that she has done reasonably well. Unfortunately, she does not want to get the vaccine. I am quite surprised, however. We talked at length about this. She apparently had a COVID infection in January and has been left with anosmia and dysgeusia. This is quite frustrating for her. She has pain in her right wrist, which started after an inadvertant fall. Her range of motion is fairly well preserved. Her bronchospasm has been stable. She has a past history of diabetes mellitus and depression. This is above and beyond her existing morbid obesity. Current Outpatient Medications   Medication Sig Dispense Refill    albuterol (PROVENTIL HFA, VENTOLIN HFA, PROAIR HFA) 90 mcg/actuation inhaler INHALE 2 PUFFS BY MOUTH EVERY 4 HOURS AS NEEDED FOR WHEEZING 1 Inhaler 11    PARoxetine (PAXIL) 20 mg tablet TAKE 1 TABLET BY MOUTH EVERY DAY AT NIGHT 30 Tab 11    metFORMIN ER (GLUCOPHAGE XR) 500 mg tablet Take 1 Tab by mouth daily (with dinner). 30 Tab 11    fluticasone propion-salmeteroL (Wixela Inhub) 100-50 mcg/dose diskus inhaler TAKE 1 PUFF BY MOUTH TWICE A DAY *RINSE MOUTH AFTER USE* 1 Inhaler 11    naproxen (NAPROSYN) 500 mg tablet Take 1 Tab by mouth two (2) times daily (with meals). 60 Tab 11    albuterol (PROVENTIL VENTOLIN) 2.5 mg /3 mL (0.083 %) nebu 3 mL by Nebulization route every four (4) hours as needed for Wheezing. 50 Nebule 11    cefUROXime (CEFTIN) 500 mg tablet Take 1 Tab by mouth two (2) times a day. 14 Tab 0    loratadine (CLARITIN) 10 mg tablet Take 10 mg by mouth daily as needed for Allergies.       valACYclovir (VALTREX) 1 gram tablet Take 0.5 Tabs by mouth daily. 30 Tab 11    omeprazole (PRILOSEC) 40 mg capsule Take 1 Cap by mouth daily.  30 Cap 11     Past Medical History:   Diagnosis Date    Arthritis     Asthma      Past Surgical History:   Procedure Laterality Date    HX CHOLECYSTECTOMY      HX COLONOSCOPY  4-    tubulovillous adenoma---Dr.Christine Small    HX GI      HX GYN      s/p myomectomy, HAY and BSO     Allergies   Allergen Reactions    Aspirin Other (comments)     Bronchospasm         REVIEW OF SYSTEMS:  General: negative for - chills or fever  ENT: negative for - headaches, nasal congestion or tinnitus  Respiratory: negative for - cough, hemoptysis, shortness of breath or wheezing  Cardiovascular : negative for - chest pain, edema, palpitations or shortness of breath  Gastrointestinal: negative for - abdominal pain, blood in stools, heartburn or nausea/vomiting  Genito-Urinary: no dysuria, trouble voiding, or hematuria  Musculoskeletal: negative for - gait disturbance, joint pain, joint stiffness or joint swelling  Neurological: no TIA or stroke symptoms  Hematologic: no bruises, no bleeding, no swollen glands  Integument: no lumps, mole changes, nail changes or rash  Endocrine: no malaise/lethargy or unexpected weight changes      Social History     Socioeconomic History    Marital status: LEGALLY      Spouse name: Not on file    Number of children: 0    Years of education: Not on file    Highest education level: Not on file   Occupational History    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    Tobacco Use    Smoking status: Never Smoker    Smokeless tobacco: Never Used   Substance and Sexual Activity    Alcohol use: No    Drug use: No    Sexual activity: Yes     Family History   Problem Relation Age of Onset    Cancer Mother     Cancer Father         Prostate cancer    Diabetes Sister        OBJECTIVE:    Visit Vitals  BP (!) 140/86   Pulse 73 Temp 98.6 °F (37 °C)   Resp 16   Ht 5' 4\" (1.626 m)   Wt 283 lb 8 oz (128.6 kg)   SpO2 100%   BMI 48.66 kg/m²     CONSTITUTIONAL: well , well nourished, appears age appropriate  EYES: perrla, eom intact  ENMT:moist mucous membranes, pharynx clear  NECK: supple. Thyroid normal  RESPIRATORY: Chest: clear to ascultation and percussion   CARDIOVASCULAR: Heart: regular rate and rhythm  GASTROINTESTINAL: Abdomen: soft, bowel sounds active  HEMATOLOGIC: no pathological lymph nodes palpated  MUSCULOSKELETAL: Extremities: no edema, pulse 1+   INTEGUMENT: No unusual rashes or suspicious skin lesions noted. Nails appear normal.  NEUROLOGIC: non-focal exam   MENTAL STATUS: alert and oriented, appropriate affect      ASSESSMENT:  1. Venous insufficiency    2. Controlled type 2 diabetes mellitus without complication, without long-term current use of insulin ([facility] [address]    3. Mild intermittent reactive airway disease without complication    4. Physical deconditioning    5. Morbid obesity with BMI of 40.0-44.9, adult ([facility] [address]    6. COVID-19 virus infection    7. Anosmia    8. Dysgeusia    9. Physical exam        PLAN:  1. She does have history of venous insufficiency with trace swelling of her lower extremities. It is not that bad currently. Support compression stockings are the treatment of choice. 2. She does have history of diabetes mellitus, but I will await the results of her hemoglobin A1c. In the intervening time, she will continue her metformin. 3. Her reactive airway disease is reasonably stable. Fortunately, she does not smoke cigarettes. She uses her rescue inhaler quite sparingly. 4. She is physically deconditioned and needs to increase her activity much more than she is doing currently. This will make a big difference as far as her sense of well being and potentially her weight. 5. Ideally, she needs to lose weight.   This can be accomplished by eating meals, eliminating snacks, and avoiding the consumption of processed carbohydrates. 6. She had a COVID infection in January and was left with dysgeusia and anosmia. She is frustrated with this. Only time will see if this improves. I strongly suggest that she does get the vaccine. .  Orders Placed This Encounter    HEMOGLOBIN A1C WITH EAG    APOLIPOPROTEIN B    CBC WITH AUTOMATED DIFF    LIPID PANEL    METABOLIC PANEL, COMPREHENSIVE    TSH 3RD GENERATION    URINALYSIS W/ RFLX MICROSCOPIC    CRP, HIGH SENSITIVITY         Follow-up and Dispositions    · Return in about 6 months (around 11/11/2021).            Serg Jerez MD

## 2021-07-25 RX ORDER — NAPROXEN 500 MG/1
TABLET ORAL
Qty: 60 TABLET | Refills: 11 | Status: SHIPPED | OUTPATIENT
Start: 2021-07-25

## 2021-08-08 RX ORDER — METFORMIN HYDROCHLORIDE 500 MG/1
500 TABLET, EXTENDED RELEASE ORAL 2 TIMES DAILY WITH MEALS
Qty: 60 TABLET | Refills: 11 | Status: SHIPPED | OUTPATIENT
Start: 2021-08-08 | End: 2022-02-07

## 2021-08-12 ENCOUNTER — TELEPHONE (OUTPATIENT)
Dept: INTERNAL MEDICINE CLINIC | Age: 56
End: 2021-08-12

## 2021-11-11 ENCOUNTER — OFFICE VISIT (OUTPATIENT)
Dept: INTERNAL MEDICINE CLINIC | Age: 56
End: 2021-11-11
Payer: COMMERCIAL

## 2021-11-11 VITALS
OXYGEN SATURATION: 98 % | HEART RATE: 74 BPM | SYSTOLIC BLOOD PRESSURE: 138 MMHG | WEIGHT: 277.8 LBS | DIASTOLIC BLOOD PRESSURE: 85 MMHG | BODY MASS INDEX: 47.43 KG/M2 | TEMPERATURE: 97.8 F | HEIGHT: 64 IN | RESPIRATION RATE: 16 BRPM

## 2021-11-11 DIAGNOSIS — R53.81 PHYSICAL DECONDITIONING: ICD-10-CM

## 2021-11-11 DIAGNOSIS — I87.2 VENOUS INSUFFICIENCY: ICD-10-CM

## 2021-11-11 DIAGNOSIS — F32.9 REACTIVE DEPRESSION: ICD-10-CM

## 2021-11-11 DIAGNOSIS — E11.9 CONTROLLED TYPE 2 DIABETES MELLITUS WITHOUT COMPLICATION, WITHOUT LONG-TERM CURRENT USE OF INSULIN (HCC): Primary | ICD-10-CM

## 2021-11-11 DIAGNOSIS — E66.01 MORBID OBESITY WITH BMI OF 40.0-44.9, ADULT (HCC): ICD-10-CM

## 2021-11-11 PROCEDURE — 99214 OFFICE O/P EST MOD 30 MIN: CPT | Performed by: INTERNAL MEDICINE

## 2021-11-11 NOTE — PROGRESS NOTES
Chief Complaint   Patient presents with    Diabetes     6 month follow up          1. Have you been to the ER, urgent care clinic since your last visit? Hospitalized since your last visit? No    2. Have you seen or consulted any other health care providers outside of the [address] [place] [address] since your last visit? Include any pap smears or colon screening.  No

## 2021-11-12 LAB
CREAT UR-MCNC: 115 MG/DL
EST. AVERAGE GLUCOSE BLD GHB EST-MCNC: 140 MG/DL
HBA1C MFR BLD: 6.5 % (ref 4–5.6)
MICROALBUMIN UR-MCNC: 1.25 MG/DL
MICROALBUMIN/CREAT UR-RTO: 11 MG/G (ref 0–30)

## 2021-11-13 NOTE — PROGRESS NOTES
[address] [place] [address] and Primary Care  [place] [address]  [address] [place] [address]  Phone:  [phone]  Fax: [phone]       Chief Complaint   Patient presents with    Diabetes     6 month follow up    . SUBJECTIVE:    Noris Puga is a 60 y.o. female comes in for return visit stating that she has done quite well. She has no complaints for the most part. Unfortunately she has not lost any meaningful weight. She has a past history of primary hypertension, dyslipidemia, mild depression and diabetes mellitus. She is not that physically active unfortunately. Current Outpatient Medications   Medication Sig Dispense Refill    metFORMIN ER (GLUCOPHAGE XR) 500 mg tablet Take 1 Tablet by mouth two (2) times daily (with meals). 60 Tablet 11    naproxen (NAPROSYN) 500 mg tablet TAKE 1 TABLET BY MOUTH TWICE A DAY WITH MEALS 60 Tablet 11    Wixela Inhub 100-50 mcg/dose diskus inhaler TAKE 1 PUFF BY MOUTH TWICE A DAY *RINSE MOUTH AFTER USE* 1 Inhaler 11    albuterol (PROVENTIL VENTOLIN) 2.5 mg /3 mL (0.083 %) nebu INHALE 1 VIAL VIA NEBULIZER EVERY 4 HOURS AS NEEDED FOR WHEEZING 75 Nebule 11    albuterol (PROVENTIL HFA, VENTOLIN HFA, PROAIR HFA) 90 mcg/actuation inhaler INHALE 2 PUFFS BY MOUTH EVERY 4 HOURS AS NEEDED FOR WHEEZING 1 Inhaler 11    PARoxetine (PAXIL) 20 mg tablet TAKE 1 TABLET BY MOUTH EVERY DAY AT NIGHT 30 Tab 11    cefUROXime (CEFTIN) 500 mg tablet Take 1 Tab by mouth two (2) times a day. 14 Tab 0    loratadine (CLARITIN) 10 mg tablet Take 10 mg by mouth daily as needed for Allergies.  valACYclovir (VALTREX) 1 gram tablet Take 0.5 Tabs by mouth daily. 30 Tab 11    omeprazole (PRILOSEC) 40 mg capsule Take 1 Cap by mouth daily.  27 Cap 11     Past Medical History:   Diagnosis Date    Arthritis     Asthma      Past Surgical History:   Procedure Laterality Date    HX CHOLECYSTECTOMY      HX COLONOSCOPY  4-    tubulovillous adenoma--- Geovanny    HX GI      HX GYN      s/p myomectomy, HAY and BSO     Allergies   Allergen Reactions    Aspirin Other (comments)     Bronchospasm         REVIEW OF SYSTEMS:  General: negative for - chills or fever  ENT: negative for - headaches, nasal congestion or tinnitus  Respiratory: negative for - cough, hemoptysis, shortness of breath or wheezing  Cardiovascular : negative for - chest pain, edema, palpitations or shortness of breath  Gastrointestinal: negative for - abdominal pain, blood in stools, heartburn or nausea/vomiting  Genito-Urinary: no dysuria, trouble voiding, or hematuria  Musculoskeletal: negative for - gait disturbance, joint pain, joint stiffness or joint swelling  Neurological: no TIA or stroke symptoms  Hematologic: no bruises, no bleeding, no swollen glands  Integument: no lumps, mole changes, nail changes or rash  Endocrine: no malaise/lethargy or unexpected weight changes      Social History     Socioeconomic History    Marital status: LEGALLY     Number of children: 0   Occupational History    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    Tobacco Use    Smoking status: Never Smoker    Smokeless tobacco: Never Used   Vaping Use    Vaping Use: Never used   Substance and Sexual Activity    Alcohol use: No    Drug use: No    Sexual activity: Yes     Family History   Problem Relation Age of Onset    Cancer Mother     Cancer Father         Prostate cancer    Diabetes Sister        OBJECTIVE:    Visit Vitals  /85   Pulse 74   Temp 97.8 °F (36.6 °C) (Oral)   Resp 16   Ht 5' 4\" (1.626 m)   Wt 277 lb 12.8 oz (126 kg)   SpO2 98%   BMI 47.68 kg/m²     CONSTITUTIONAL: well , well nourished, appears age appropriate  EYES: perrla, eom intact  ENMT:moist mucous membranes, pharynx clear  NECK: supple.  Thyroid normal  RESPIRATORY: Chest: clear to ascultation and percussion   CARDIOVASCULAR: Heart: regular rate and rhythm  GASTROINTESTINAL: Abdomen: soft, bowel sounds active  HEMATOLOGIC: no pathological lymph nodes palpated  MUSCULOSKELETAL: Extremities: no edema, pulse 1+   INTEGUMENT: No unusual rashes or suspicious skin lesions noted. Nails appear normal.  NEUROLOGIC: non-focal exam   MENTAL STATUS: alert and oriented, appropriate affect      ASSESSMENT:  1. Controlled type 2 diabetes mellitus without complication, without long-term current use of insulin ([facility])    2. Venous insufficiency    3. Morbid obesity with BMI of 40.0-44.9, adult ([address]    4. Physical deconditioning    5. Reactive depression        PLAN:  1. Hopefully her diabetes is doing well, but I will await the results of her hemoglobin A1c.  2. She does have mild venous insufficiency, but this is not a major problem this time of the year. 3. She really needs to lose weight. This can be accomplished by eating meals, eliminating snacks, and avoiding the consumption of processed carbohydrates. 4. She needs to increase her physical activity on a more consistent basis. 5. Her depression is quite stable with Paxil. No further intervention is necessary. .  Orders Placed This Encounter    MICROALBUMIN, UR, RAND    HEMOGLOBIN A1C WITH EAG         Follow-up and Dispositions    · Return in about 4 months (around 3/11/2022).            Venus Alvares MD

## 2022-02-07 RX ORDER — METFORMIN HYDROCHLORIDE 500 MG/1
TABLET, EXTENDED RELEASE ORAL
Qty: 90 TABLET | Refills: 3 | Status: SHIPPED | OUTPATIENT
Start: 2022-02-07 | End: 2022-09-15

## 2022-03-11 ENCOUNTER — OFFICE VISIT (OUTPATIENT)
Dept: INTERNAL MEDICINE CLINIC | Age: 57
End: 2022-03-11
Payer: COMMERCIAL

## 2022-03-11 VITALS
SYSTOLIC BLOOD PRESSURE: 128 MMHG | HEART RATE: 75 BPM | HEIGHT: 64 IN | OXYGEN SATURATION: 96 % | DIASTOLIC BLOOD PRESSURE: 77 MMHG | TEMPERATURE: 98.7 F | BODY MASS INDEX: 49.05 KG/M2 | RESPIRATION RATE: 20 BRPM | WEIGHT: 287.3 LBS

## 2022-03-11 DIAGNOSIS — R23.4 SKIN ESCHAR: ICD-10-CM

## 2022-03-11 DIAGNOSIS — R53.81 PHYSICAL DECONDITIONING: ICD-10-CM

## 2022-03-11 DIAGNOSIS — E78.5 DYSLIPIDEMIA: ICD-10-CM

## 2022-03-11 DIAGNOSIS — E66.01 MORBID OBESITY WITH BMI OF 40.0-44.9, ADULT (HCC): ICD-10-CM

## 2022-03-11 DIAGNOSIS — I87.2 VENOUS INSUFFICIENCY: Primary | ICD-10-CM

## 2022-03-11 DIAGNOSIS — E11.9 CONTROLLED TYPE 2 DIABETES MELLITUS WITHOUT COMPLICATION, WITHOUT LONG-TERM CURRENT USE OF INSULIN (HCC): ICD-10-CM

## 2022-03-11 PROCEDURE — 99214 OFFICE O/P EST MOD 30 MIN: CPT | Performed by: INTERNAL MEDICINE

## 2022-03-11 PROCEDURE — 3051F HG A1C>EQUAL 7.0%<8.0%: CPT | Performed by: INTERNAL MEDICINE

## 2022-03-11 NOTE — PROGRESS NOTES
Chief Complaint   Patient presents with    Follow Up Chronic Condition    Diabetes     1. Have you been to the ER, urgent care clinic since your last visit? Hospitalized since your last visit? No    2. Have you seen or consulted any other health care providers outside of the [address] [place] [address] since your last visit? Include any pap smears or colon screening.  No  Visit Vitals  /77   Pulse 75   Temp 98.7 °F (37.1 °C) (Oral)   Resp 20   Ht 5' 4\" (1.626 m)   Wt 287 lb 4.8 oz (130.3 kg)   SpO2 96%   BMI 49.31 kg/m²

## 2022-03-11 NOTE — PROGRESS NOTES
[address] [place] [address] and Primary Care  [place] [address]  [address] [place] [address]  Phone:  [phone]  Fax: [phone]       Chief Complaint   Patient presents with    Follow Up Chronic Condition    Diabetes   . SUBJECTIVE:    Brittney Chung is a 59 y.o. female comes in for return visit complaining of itching on the right side of her distal right leg laterally after she scraped it on an object while at work. It drained for a few days and then dried up and she is keeping it clean, although she has been applying the topical preparation as well as Band-Aid. She is concerned about this. Complicating this is orthostatic swelling of the distal lower extremities, typically worse at the end of the day with significant improvement after the night's sleep. She has had no meaningful weight loss. She has a past history of primary hypertension, dyslipidemia, as well as diabetes. Current Outpatient Medications   Medication Sig Dispense Refill    metFORMIN ER (GLUCOPHAGE XR) 500 mg tablet TAKE 1 TAB BY MOUTH DAILY (WITH DINNER). 90 Tablet 3    naproxen (NAPROSYN) 500 mg tablet TAKE 1 TABLET BY MOUTH TWICE A DAY WITH MEALS 60 Tablet 11    Wixela Inhub 100-50 mcg/dose diskus inhaler TAKE 1 PUFF BY MOUTH TWICE A DAY *RINSE MOUTH AFTER USE* 1 Inhaler 11    albuterol (PROVENTIL VENTOLIN) 2.5 mg /3 mL (0.083 %) nebu INHALE 1 VIAL VIA NEBULIZER EVERY 4 HOURS AS NEEDED FOR WHEEZING 75 Nebule 11    albuterol (PROVENTIL HFA, VENTOLIN HFA, PROAIR HFA) 90 mcg/actuation inhaler INHALE 2 PUFFS BY MOUTH EVERY 4 HOURS AS NEEDED FOR WHEEZING 1 Inhaler 11    PARoxetine (PAXIL) 20 mg tablet TAKE 1 TABLET BY MOUTH EVERY DAY AT NIGHT 30 Tab 11    loratadine (CLARITIN) 10 mg tablet Take 10 mg by mouth daily as needed for Allergies.  valACYclovir (VALTREX) 1 gram tablet Take 0.5 Tabs by mouth daily. 30 Tab 11    omeprazole (PRILOSEC) 40 mg capsule Take 1 Cap by mouth daily.  30 Cap 11    cefUROXime (CEFTIN) 500 mg tablet Take 1 Tab by mouth two (2) times a day.  14 Tab 0     Past Medical History:   Diagnosis Date    Arthritis     Asthma      Past Surgical History:   Procedure Laterality Date    HX CHOLECYSTECTOMY      HX COLONOSCOPY  4-    tubulovillous adenoma---Dr.Christine Small    HX GI      HX GYN      s/p myomectomy, HAY and BSO     Allergies   Allergen Reactions    Aspirin Other (comments)     Bronchospasm         REVIEW OF SYSTEMS:  General: negative for - chills or fever  ENT: negative for - headaches, nasal congestion or tinnitus  Respiratory: negative for - cough, hemoptysis, shortness of breath or wheezing  Cardiovascular : negative for - chest pain, edema, palpitations or shortness of breath  Gastrointestinal: negative for - abdominal pain, blood in stools, heartburn or nausea/vomiting  Genito-Urinary: no dysuria, trouble voiding, or hematuria  Musculoskeletal: negative for - gait disturbance, joint pain, joint stiffness or joint swelling  Neurological: no TIA or stroke symptoms  Hematologic: no bruises, no bleeding, no swollen glands  Integument: no lumps, mole changes, nail changes or rash  Endocrine: no malaise/lethargy or unexpected weight changes      Social History     Socioeconomic History    Marital status: LEGALLY     Number of children: 0   Occupational History    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    Tobacco Use    Smoking status: Never Smoker    Smokeless tobacco: Never Used   Vaping Use    Vaping Use: Never used   Substance and Sexual Activity    Alcohol use: No    Drug use: No    Sexual activity: Yes     Family History   Problem Relation Age of Onset    Cancer Mother     Cancer Father         Prostate cancer    Diabetes Sister        OBJECTIVE:    Visit Vitals  /77   Pulse 75   Temp 98.7 °F (37.1 °C) (Oral)   Resp 20   Ht 5' 4\" (1.626 m)   Wt 287 lb 4.8 oz (130.3 kg)   LMP  (LMP Unknown)   SpO2 96%   BMI 49.31 kg/m²     CONSTITUTIONAL: well , well nourished, appears age appropriate  EYES: perrla, eom intact  ENMT:moist mucous membranes, pharynx clear  NECK: supple. Thyroid normal  RESPIRATORY: Chest: clear to ascultation and percussion   CARDIOVASCULAR: Heart: regular rate and rhythm  GASTROINTESTINAL: Abdomen: soft, bowel sounds active  HEMATOLOGIC: no pathological lymph nodes palpated  MUSCULOSKELETAL: Extremities: 2+ edema, pulse 1+   INTEGUMENT: No unusual rashes or suspicious skin lesions noted. Nails appear normal.  NEUROLOGIC: non-focal exam   MENTAL STATUS: alert and oriented, appropriate affect      ASSESSMENT:  1. Venous insufficiency    2. Skin eschar    3. Controlled type 2 diabetes mellitus without complication, without long-term current use of insulin ([address]    4. Morbid obesity with BMI of 40.0-44.9, adult ([address]    5. Physical deconditioning    6. Dyslipidemia          PLAN:  1. The patient has venous insufficiency, which is leading to the edema. She has had this off and on for years. I suggest compression stockings because of the severity of the swelling this time of year. The swelling is in both legs, slightly asymmetrical, right being greater than the left. The swelling is only involving below the knee. 2. Her eschar is in the right lateral leg, but it is epithelialized nicely. Nothing needs to be done other than keeping it clean with soap and water. 3. Her diabetes hopefully is stable, but I will await the results of her hemoglobin A1c.  4. She needs to lose weight. This can be addressed by eating meals, eliminating snacks, and avoiding the consumption of processed carbohydrates. 5. She is physically deconditioned and needs to exercise on a more consistent basis as we have discussed repetitively. 6. Her overall cardiovascular risk is mild to moderate. I will await the results of her lipid profile.     .  Orders Placed This Encounter    HEMOGLOBIN A1C WITH EAG  METABOLIC PANEL, BASIC    APOLIPOPROTEIN B         Follow-up and Dispositions    · Return in about 3 months (around 6/11/2022).            Jv Jones MD

## 2022-03-12 LAB
ANION GAP SERPL CALC-SCNC: 3 MMOL/L (ref 5–15)
BUN SERPL-MCNC: 9 MG/DL (ref 6–20)
BUN/CREAT SERPL: 11 (ref 12–20)
CALCIUM SERPL-MCNC: 9.6 MG/DL (ref 8.5–10.1)
CHLORIDE SERPL-SCNC: 105 MMOL/L (ref 97–108)
CO2 SERPL-SCNC: 30 MMOL/L (ref 21–32)
CREAT SERPL-MCNC: 0.85 MG/DL (ref 0.55–1.02)
EST. AVERAGE GLUCOSE BLD GHB EST-MCNC: 157 MG/DL
GLUCOSE SERPL-MCNC: 145 MG/DL (ref 65–100)
HBA1C MFR BLD: 7.1 % (ref 4–5.6)
POTASSIUM SERPL-SCNC: 3.9 MMOL/L (ref 3.5–5.1)
SODIUM SERPL-SCNC: 138 MMOL/L (ref 136–145)

## 2022-03-13 LAB — APO B SERPL-MCNC: 80 MG/DL

## 2022-03-18 PROBLEM — I87.2 VENOUS INSUFFICIENCY: Status: ACTIVE | Noted: 2020-08-10

## 2022-03-19 PROBLEM — J45.909 REACTIVE AIRWAY DISEASE: Status: ACTIVE | Noted: 2017-05-17

## 2022-03-19 PROBLEM — R53.81 PHYSICAL DECONDITIONING: Status: ACTIVE | Noted: 2020-08-10

## 2022-03-19 PROBLEM — F32.9 REACTIVE DEPRESSION: Status: ACTIVE | Noted: 2020-02-04

## 2022-03-19 PROBLEM — S93.401A SPRAIN OF RIGHT ANKLE: Status: ACTIVE | Noted: 2017-05-17

## 2022-04-15 RX ORDER — PAROXETINE HYDROCHLORIDE 20 MG/1
TABLET, FILM COATED ORAL
Qty: 30 TABLET | Refills: 11 | Status: SHIPPED | OUTPATIENT
Start: 2022-04-15

## 2022-04-15 RX ORDER — ALBUTEROL SULFATE 90 UG/1
AEROSOL, METERED RESPIRATORY (INHALATION)
Qty: 6.7 EACH | Refills: 11 | Status: SHIPPED | OUTPATIENT
Start: 2022-04-15

## 2022-06-17 ENCOUNTER — OFFICE VISIT (OUTPATIENT)
Dept: INTERNAL MEDICINE CLINIC | Age: 57
End: 2022-06-17
Payer: COMMERCIAL

## 2022-06-17 VITALS
SYSTOLIC BLOOD PRESSURE: 122 MMHG | DIASTOLIC BLOOD PRESSURE: 77 MMHG | HEIGHT: 64 IN | TEMPERATURE: 98.2 F | BODY MASS INDEX: 47.99 KG/M2 | HEART RATE: 76 BPM | OXYGEN SATURATION: 99 % | WEIGHT: 281.1 LBS | RESPIRATION RATE: 18 BRPM

## 2022-06-17 DIAGNOSIS — I87.2 VENOUS INSUFFICIENCY: ICD-10-CM

## 2022-06-17 DIAGNOSIS — J45.20 MILD INTERMITTENT REACTIVE AIRWAY DISEASE WITHOUT COMPLICATION: Primary | ICD-10-CM

## 2022-06-17 DIAGNOSIS — E66.01 MORBID OBESITY WITH BMI OF 40.0-44.9, ADULT (HCC): ICD-10-CM

## 2022-06-17 DIAGNOSIS — E11.9 CONTROLLED TYPE 2 DIABETES MELLITUS WITHOUT COMPLICATION, WITHOUT LONG-TERM CURRENT USE OF INSULIN (HCC): ICD-10-CM

## 2022-06-17 PROCEDURE — 3051F HG A1C>EQUAL 7.0%<8.0%: CPT | Performed by: INTERNAL MEDICINE

## 2022-06-17 PROCEDURE — 99214 OFFICE O/P EST MOD 30 MIN: CPT | Performed by: INTERNAL MEDICINE

## 2022-06-17 RX ORDER — ALBUTEROL SULFATE 0.83 MG/ML
2.5 SOLUTION RESPIRATORY (INHALATION)
Qty: 75 NEBULE | Refills: 11 | Status: SHIPPED | OUTPATIENT
Start: 2022-06-17

## 2022-06-17 NOTE — PROGRESS NOTES
Helio Overton is a 59 y.o. female    Chief Complaint   Patient presents with    Diabetes     1. Have you been to the ER, urgent care clinic since your last visit? Hospitalized since your last visit? No       2. Have you seen or consulted any other health care providers outside of the [address] [place] [address] since your last visit? Include any pap smears or colon screening.   No

## 2022-06-18 LAB
EST. AVERAGE GLUCOSE BLD GHB EST-MCNC: 154 MG/DL
HBA1C MFR BLD: 7 % (ref 4–5.6)

## 2022-06-18 NOTE — PROGRESS NOTES
[address] [place] [address] and Primary Care  [place] [address]  [address] [place] [address]  Phone:  [phone]  Fax: [phone]       Chief Complaint   Patient presents with    Diabetes   . SUBJECTIVE:    Maribell Gutiérrez is a 59 y.o. female comes in complaining of upper respiratory symptoms for the last week or so. This has been primarily manifesting itself above and beyond mild nasal congestion as a persistent hacky cough. This is accompanied by audible wheezing. She has done this previously. She does not smoke cigarettes. She has had no meaningful weight loss. She has a past history of diabetes as well as mild depression. Current Outpatient Medications   Medication Sig Dispense Refill    albuterol (PROVENTIL VENTOLIN) 2.5 mg /3 mL (0.083 %) nebu 3 mL by Nebulization route every four (4) hours as needed for Wheezing. 75 Nebule 11    albuterol (PROVENTIL HFA, VENTOLIN HFA, PROAIR HFA) 90 mcg/actuation inhaler TAKE 2 PUFFS BY MOUTH EVERY 4 HOURS AS NEEDED FOR WHEEZE 6.7 Each 11    PARoxetine (PAXIL) 20 mg tablet TAKE 1 TABLET BY MOUTH EVERY DAY AT NIGHT 30 Tablet 11    metFORMIN ER (GLUCOPHAGE XR) 500 mg tablet TAKE 1 TAB BY MOUTH DAILY (WITH DINNER). 90 Tablet 3    naproxen (NAPROSYN) 500 mg tablet TAKE 1 TABLET BY MOUTH TWICE A DAY WITH MEALS 60 Tablet 11    Wixela Inhub 100-50 mcg/dose diskus inhaler TAKE 1 PUFF BY MOUTH TWICE A DAY *RINSE MOUTH AFTER USE* 1 Inhaler 11    cefUROXime (CEFTIN) 500 mg tablet Take 1 Tab by mouth two (2) times a day. 14 Tab 0    loratadine (CLARITIN) 10 mg tablet Take 10 mg by mouth daily as needed for Allergies.  valACYclovir (VALTREX) 1 gram tablet Take 0.5 Tabs by mouth daily. 30 Tab 11    omeprazole (PRILOSEC) 40 mg capsule Take 1 Cap by mouth daily.  30 Cap 11     Past Medical History:   Diagnosis Date    Arthritis     Asthma      Past Surgical History:   Procedure Laterality Date    HX CHOLECYSTECTOMY      HX COLONOSCOPY 4-    tubulovillous adenoma---Dr.Christine Small    HX GI      HX GYN      s/p myomectomy, HAY and BSO     Allergies   Allergen Reactions    Aspirin Other (comments)     Bronchospasm         REVIEW OF SYSTEMS:  General: negative for - chills or fever  ENT: negative for - headaches, nasal congestion or tinnitus  Respiratory: negative for - cough, hemoptysis, shortness of breath or wheezing  Cardiovascular : negative for - chest pain, edema, palpitations or shortness of breath  Gastrointestinal: negative for - abdominal pain, blood in stools, heartburn or nausea/vomiting  Genito-Urinary: no dysuria, trouble voiding, or hematuria  Musculoskeletal: negative for - gait disturbance, joint pain, joint stiffness or joint swelling  Neurological: no TIA or stroke symptoms  Hematologic: no bruises, no bleeding, no swollen glands  Integument: no lumps, mole changes, nail changes or rash  Endocrine: no malaise/lethargy or unexpected weight changes      Social History     Socioeconomic History    Marital status: LEGALLY     Number of children: 0   Occupational History    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    Tobacco Use    Smoking status: Never Smoker    Smokeless tobacco: Never Used   Vaping Use    Vaping Use: Never used   Substance and Sexual Activity    Alcohol use: No    Drug use: No    Sexual activity: Yes     Family History   Problem Relation Age of Onset    Cancer Mother     Cancer Father         Prostate cancer    Diabetes Sister        OBJECTIVE:    Visit Vitals  /77 (BP 1 Location: Right arm, BP Patient Position: Sitting)   Pulse 76   Temp 98.2 °F (36.8 °C) (Oral)   Resp 18   Ht 5' 4\" (1.626 m)   Wt 281 lb 1.6 oz (127.5 kg)   SpO2 99%   BMI 48.25 kg/m²     CONSTITUTIONAL: well , well nourished, appears age appropriate  EYES: perrla, eom intact  ENMT:moist mucous membranes, pharynx clear  NECK: supple.  Thyroid normal  RESPIRATORY: Chest: Bilateral fine expiratory rhonchi  CARDIOVASCULAR: Heart: regular rate and rhythm  GASTROINTESTINAL: Abdomen: soft, bowel sounds active  HEMATOLOGIC: no pathological lymph nodes palpated  MUSCULOSKELETAL: Extremities: no edema, pulse 1+   INTEGUMENT: No unusual rashes or suspicious skin lesions noted. Nails appear normal.  NEUROLOGIC: non-focal exam   MENTAL STATUS: alert and oriented, appropriate affect      ASSESSMENT:  1. Mild intermittent reactive airway disease without complication    2. Controlled type 2 diabetes mellitus without complication, without long-term current use of insulin ([facility])    3. Venous insufficiency    4. Morbid obesity with BMI of 40.0-44.9, adult ([facility] [address]        PLAN:  1. She has reactive airway disease, so will be treated with bronchodilators. She will continue her Ventolin and use her maintenance inhaler on a more consistent basis. 2. From a diabetic perspective, I will await the results of her hemoglobin A1c. Most importantly, she can lose weight, which will help her dysmetabolic status. 3. She does have chronic venous insufficiency related to orthostatic swelling of her lower extremities, but this is not worse currently. 4. She really needs to lose weight. This can be accomplished by eating meals, eliminating snacks, and avoiding the consumption of processed carbohydrates. .  Orders Placed This Encounter    HEMOGLOBIN A1C WITH EAG    albuterol (PROVENTIL VENTOLIN) 2.5 mg /3 mL (0.083 %) nebu         Follow-up and Dispositions    · Return in about 3 months (around 9/17/2022).            Michaela Terry MD

## 2022-08-04 DIAGNOSIS — J45.20 MILD INTERMITTENT REACTIVE AIRWAY DISEASE WITHOUT COMPLICATION: ICD-10-CM

## 2022-08-04 RX ORDER — FLUTICASONE PROPIONATE AND SALMETEROL 100; 50 UG/1; UG/1
POWDER RESPIRATORY (INHALATION)
Qty: 60 EACH | Refills: 11 | Status: SHIPPED | OUTPATIENT
Start: 2022-08-04

## 2022-09-15 RX ORDER — METFORMIN HYDROCHLORIDE 500 MG/1
TABLET, EXTENDED RELEASE ORAL
Qty: 180 TABLET | Refills: 3 | Status: SHIPPED | OUTPATIENT
Start: 2022-09-15 | End: 2022-10-09 | Stop reason: SDUPTHER

## 2022-09-19 RX ORDER — DEXTROMETHORPHAN HB/DOXYLAMINE 15-6.25/15
SOLUTION, ORAL ORAL
Qty: 30 TABLET | Refills: 0 | Status: SHIPPED | OUTPATIENT
Start: 2022-09-19

## 2022-09-19 RX ORDER — AZITHROMYCIN 250 MG/1
TABLET, FILM COATED ORAL
Qty: 6 TABLET | Refills: 0 | Status: SHIPPED | OUTPATIENT
Start: 2022-09-19 | End: 2022-09-24

## 2022-09-19 RX ORDER — PREDNISONE 20 MG/1
20 TABLET ORAL 2 TIMES DAILY
Qty: 14 TABLET | Refills: 0 | Status: SHIPPED | OUTPATIENT
Start: 2022-09-19 | End: 2022-09-26

## 2022-09-23 ENCOUNTER — OFFICE VISIT (OUTPATIENT)
Dept: INTERNAL MEDICINE CLINIC | Age: 57
End: 2022-09-23
Payer: COMMERCIAL

## 2022-09-23 VITALS
RESPIRATION RATE: 16 BRPM | OXYGEN SATURATION: 100 % | SYSTOLIC BLOOD PRESSURE: 142 MMHG | HEART RATE: 79 BPM | TEMPERATURE: 98.4 F | DIASTOLIC BLOOD PRESSURE: 88 MMHG | BODY MASS INDEX: 47.33 KG/M2 | HEIGHT: 64 IN | WEIGHT: 277.2 LBS

## 2022-09-23 DIAGNOSIS — I87.2 VENOUS INSUFFICIENCY: ICD-10-CM

## 2022-09-23 DIAGNOSIS — E78.5 DYSLIPIDEMIA: ICD-10-CM

## 2022-09-23 DIAGNOSIS — E66.01 MORBID OBESITY WITH BMI OF 40.0-44.9, ADULT (HCC): ICD-10-CM

## 2022-09-23 DIAGNOSIS — J45.20 MILD INTERMITTENT REACTIVE AIRWAY DISEASE WITHOUT COMPLICATION: Primary | ICD-10-CM

## 2022-09-23 DIAGNOSIS — E11.9 CONTROLLED TYPE 2 DIABETES MELLITUS WITHOUT COMPLICATION, WITHOUT LONG-TERM CURRENT USE OF INSULIN (HCC): ICD-10-CM

## 2022-09-23 PROCEDURE — 99214 OFFICE O/P EST MOD 30 MIN: CPT | Performed by: INTERNAL MEDICINE

## 2022-09-23 PROCEDURE — 3051F HG A1C>EQUAL 7.0%<8.0%: CPT | Performed by: INTERNAL MEDICINE

## 2022-09-23 NOTE — PROGRESS NOTES
[address] [place] [address] and Primary Care  [place] [address]  [address] [place] [address]  Phone:  [phone]  Fax: [phone]       Chief Complaint   Patient presents with    Follow-up    Diabetes     Patient here for follow up diabetes. .      SUBJECTIVE:    Marci Mccain is a 59 y.o. female comes in for return visit stating that for about a week and a half ago, she developed upper respiratory type symptoms consisting of flare of her asthma and slight nasal congestion. She is better since I placed her on prednisone and a Z-Zach. She continues to wheeze however, not as disruptive as she previously was. 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 This last week she worked virtually. She does not check blood sugars which I did not suggest that she do primarily because hemoglobin A1cs have been excellent. Following the prednisone however blood sugars will probably rise. I will check her numbers today. There has been no meaningful weight loss. She has a past history of primary hypertension, as well as dyslipidemia above and beyond her existing obesity. Current Outpatient Medications   Medication Sig Dispense Refill    azithromycin (ZITHROMAX) 250 mg tablet Take 2 tablets today, then take 1 tablet daily 6 Tablet 0    predniSONE (DELTASONE) 20 mg tablet Take 1 Tablet by mouth two (2) times a day for 7 days. 14 Tablet 0    loratadine-pseudoephedrine (Loratadine-D)  mg per tablet Take 1 tab at 7am and 3pm 30 Tablet 0    metFORMIN ER (GLUCOPHAGE XR) 500 mg tablet TAKE 1 TABLET BY MOUTH TWO (2) TIMES DAILY (WITH MEALS). 180 Tablet 3    Wixela Inhub 100-50 mcg/dose diskus inhaler TAKE 1 PUFF BY MOUTH TWICE A DAY *RINSE MOUTH AFTER USE* 60 Each 11    albuterol (PROVENTIL VENTOLIN) 2.5 mg /3 mL (0.083 %) nebu 3 mL by Nebulization route every four (4) hours as needed for Wheezing.  75 Nebule 11    albuterol (PROVENTIL HFA, VENTOLIN HFA, PROAIR HFA) 90 mcg/actuation inhaler TAKE 2 PUFFS BY MOUTH EVERY 4 HOURS AS NEEDED FOR WHEEZE 6.7 Each 11    PARoxetine (PAXIL) 20 mg tablet TAKE 1 TABLET BY MOUTH EVERY DAY AT NIGHT 30 Tablet 11    naproxen (NAPROSYN) 500 mg tablet TAKE 1 TABLET BY MOUTH TWICE A DAY WITH MEALS 60 Tablet 11    cefUROXime (CEFTIN) 500 mg tablet Take 1 Tab by mouth two (2) times a day. 14 Tab 0    loratadine (CLARITIN) 10 mg tablet Take 10 mg by mouth daily as needed for Allergies. valACYclovir (VALTREX) 1 gram tablet Take 0.5 Tabs by mouth daily. 30 Tab 11    omeprazole (PRILOSEC) 40 mg capsule Take 1 Cap by mouth daily.  27 Cap 11     Past Medical History:   Diagnosis Date    Arthritis     Asthma      Past Surgical History:   Procedure Laterality Date    HX CHOLECYSTECTOMY      HX COLONOSCOPY  4-    tubulovillous adenoma---Dr.Christine Small    HX GI      HX GYN      s/p myomectomy, HAY and BSO     Allergies   Allergen Reactions    Aspirin Other (comments)     Bronchospasm         REVIEW OF SYSTEMS:  General: negative for - chills or fever  ENT: negative for - headaches, nasal congestion or tinnitus  Respiratory: negative for - cough, hemoptysis, shortness of breath or wheezing  Cardiovascular : negative for - chest pain, edema, palpitations or shortness of breath  Gastrointestinal: negative for - abdominal pain, blood in stools, heartburn or nausea/vomiting  Genito-Urinary: no dysuria, trouble voiding, or hematuria  Musculoskeletal: negative for - gait disturbance, joint pain, joint stiffness or joint swelling  Neurological: no TIA or stroke symptoms  Hematologic: no bruises, no bleeding, no swollen glands  Integument: no lumps, mole changes, nail changes or rash  Endocrine: no malaise/lethargy or unexpected weight changes      Social History     Socioeconomic History    Marital status: LEGALLY     Number of children: 0   Occupational History    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    Tobacco Use    Smoking status: Never    Smokeless tobacco: Never   Vaping Use    Vaping Use: Never used   Substance and Sexual Activity    Alcohol use: No    Drug use: No    Sexual activity: Yes     Family History   Problem Relation Age of Onset    Cancer Mother     Cancer Father         Prostate cancer    Diabetes Sister        OBJECTIVE:    Visit Vitals  BP (!) 142/88   Pulse 79   Temp 98.4 °F (36.9 °C) (Oral)   Resp 16   Ht 5' 4\" (1.626 m)   Wt 277 lb 3.2 oz (125.7 kg)   SpO2 100%   BMI 47.58 kg/m²     CONSTITUTIONAL: well , well nourished, appears age appropriate  EYES: perrla, eom intact  ENMT:moist mucous membranes, pharynx clear  NECK: supple. Thyroid normal  RESPIRATORY: Chest: Bilateral fine expiratory rhonchi  CARDIOVASCULAR: Heart: regular rate and rhythm  GASTROINTESTINAL: Abdomen: soft, bowel sounds active  HEMATOLOGIC: no pathological lymph nodes palpated  MUSCULOSKELETAL: Extremities: trace edema, pulse 1+   INTEGUMENT: No unusual rashes or suspicious skin lesions noted. Nails appear normal.  NEUROLOGIC: non-focal exam   MENTAL STATUS: alert and oriented, appropriate affect      ASSESSMENT:  1. Mild intermittent reactive airway disease without complication    2. Morbid obesity with BMI of 40.0-44.9, adult ([facility]    3. Controlled type 2 diabetes mellitus without complication, without long-term current use of insulin ([facility] [address]    4. Venous insufficiency    5. Dyslipidemia        PLAN:  1. The patient has reactive airway disease. She will continue her bronchodilators as prescribed including the systemic prednisone. If after she completes the prednisone, she is still wheezing, then I will give her another five-day course in view of her recalcitrant disease. 2. Again, weight reduction is strongly indicated. She remains morbidly obese. This can be accomplished by eating meals, eliminating snacks, and avoiding the consumption of processed carbohydrates. 3. Her diabetes is doing well.   I am concerned because of the steroid usage and the potential worsening of diabetes that could be the end result. I will await the results of her labs. 4. She has chronic venous insufficiency leading to trace edema of her lower extremities currently. Follow-up and Dispositions    Return in about 3 months (around 12/23/2022).            Jl Morse MD

## 2022-09-23 NOTE — PROGRESS NOTES
Kojo Ratliff is a 59 y.o. female  Chief Complaint   Patient presents with    Follow-up    Diabetes     Patient here for follow up diabetes. 1. Have you been to the ER, urgent care clinic since your last visit? Hospitalized since your last visit? No    2. Have you seen or consulted any other health care providers outside of the [address] [place] [address] since your last visit? Include any pap smears or colon screening.  No

## 2022-09-24 LAB
ALBUMIN SERPL-MCNC: 4.3 G/DL (ref 3.5–5)
ALBUMIN/GLOB SERPL: 1.3 {RATIO} (ref 1.1–2.2)
ALP SERPL-CCNC: 64 U/L (ref 45–117)
ALT SERPL-CCNC: 20 U/L (ref 12–78)
ANION GAP SERPL CALC-SCNC: 4 MMOL/L (ref 5–15)
APPEARANCE UR: ABNORMAL
AST SERPL-CCNC: 10 U/L (ref 15–37)
BACTERIA URNS QL MICRO: ABNORMAL /HPF
BILIRUB SERPL-MCNC: 0.3 MG/DL (ref 0.2–1)
BILIRUB UR QL: NEGATIVE
BUN SERPL-MCNC: 10 MG/DL (ref 6–20)
BUN/CREAT SERPL: 13 (ref 12–20)
CALCIUM SERPL-MCNC: 10.5 MG/DL (ref 8.5–10.1)
CAOX CRY URNS QL MICRO: ABNORMAL
CHLORIDE SERPL-SCNC: 106 MMOL/L (ref 97–108)
CHOLEST SERPL-MCNC: 170 MG/DL
CO2 SERPL-SCNC: 30 MMOL/L (ref 21–32)
COLOR UR: ABNORMAL
CREAT SERPL-MCNC: 0.78 MG/DL (ref 0.55–1.02)
CRP SERPL HS-MCNC: 3.5 MG/L
EPITH CASTS URNS QL MICRO: ABNORMAL /LPF
GLOBULIN SER CALC-MCNC: 3.3 G/DL (ref 2–4)
GLUCOSE SERPL-MCNC: 97 MG/DL (ref 65–100)
GLUCOSE UR STRIP.AUTO-MCNC: NEGATIVE MG/DL
HDLC SERPL-MCNC: 60 MG/DL
HDLC SERPL: 2.8 {RATIO} (ref 0–5)
HGB UR QL STRIP: NEGATIVE
KETONES UR QL STRIP.AUTO: ABNORMAL MG/DL
LDLC SERPL CALC-MCNC: 93.4 MG/DL (ref 0–100)
LEUKOCYTE ESTERASE UR QL STRIP.AUTO: NEGATIVE
MUCOUS THREADS URNS QL MICRO: ABNORMAL /LPF
NITRITE UR QL STRIP.AUTO: NEGATIVE
PH UR STRIP: 6 [PH] (ref 5–8)
POTASSIUM SERPL-SCNC: 4.3 MMOL/L (ref 3.5–5.1)
PROT SERPL-MCNC: 7.6 G/DL (ref 6.4–8.2)
PROT UR STRIP-MCNC: ABNORMAL MG/DL
RBC #/AREA URNS HPF: ABNORMAL /HPF (ref 0–5)
SODIUM SERPL-SCNC: 140 MMOL/L (ref 136–145)
SP GR UR REFRACTOMETRY: 1.03 (ref 1–1.03)
TRIGL SERPL-MCNC: 83 MG/DL (ref ?–150)
TSH SERPL DL<=0.05 MIU/L-ACNC: 0.69 UIU/ML (ref 0.36–3.74)
UROBILINOGEN UR QL STRIP.AUTO: 0.2 EU/DL (ref 0.2–1)
VLDLC SERPL CALC-MCNC: 16.6 MG/DL
WBC URNS QL MICRO: ABNORMAL /HPF (ref 0–4)

## 2022-09-25 LAB — APO B SERPL-MCNC: 88 MG/DL

## 2022-09-26 ENCOUNTER — TELEPHONE (OUTPATIENT)
Dept: INTERNAL MEDICINE CLINIC | Age: 57
End: 2022-09-26

## 2022-09-26 LAB
EST. AVERAGE GLUCOSE BLD GHB EST-MCNC: NORMAL MG/DL
HBA1C MFR BLD: NORMAL % (ref 4–5.6)

## 2022-10-06 ENCOUNTER — TELEPHONE (OUTPATIENT)
Dept: INTERNAL MEDICINE CLINIC | Age: 57
End: 2022-10-06

## 2022-10-06 NOTE — TELEPHONE ENCOUNTER
----- Message from Renetta Monzon MD sent at 10/2/2022  4:13 PM EDT -----  Need repeat hemoglobin A1c

## 2022-10-07 ENCOUNTER — CLINICAL SUPPORT (OUTPATIENT)
Dept: INTERNAL MEDICINE CLINIC | Age: 57
End: 2022-10-07

## 2022-10-07 DIAGNOSIS — E11.9 CONTROLLED TYPE 2 DIABETES MELLITUS WITHOUT COMPLICATION, WITHOUT LONG-TERM CURRENT USE OF INSULIN (HCC): Primary | ICD-10-CM

## 2022-10-07 LAB
EST. AVERAGE GLUCOSE BLD GHB EST-MCNC: 166 MG/DL
HBA1C MFR BLD: 7.4 % (ref 4–5.6)

## 2022-10-09 RX ORDER — METFORMIN HYDROCHLORIDE 500 MG/1
TABLET, EXTENDED RELEASE ORAL
Qty: 270 TABLET | Refills: 3 | Status: SHIPPED | OUTPATIENT
Start: 2022-10-09

## 2023-03-07 ENCOUNTER — OFFICE VISIT (OUTPATIENT)
Dept: INTERNAL MEDICINE CLINIC | Age: 58
End: 2023-03-07

## 2023-03-07 VITALS
BODY MASS INDEX: 46.26 KG/M2 | DIASTOLIC BLOOD PRESSURE: 80 MMHG | RESPIRATION RATE: 18 BRPM | HEIGHT: 64 IN | TEMPERATURE: 97.9 F | WEIGHT: 271 LBS | OXYGEN SATURATION: 97 % | SYSTOLIC BLOOD PRESSURE: 139 MMHG | HEART RATE: 79 BPM

## 2023-03-07 DIAGNOSIS — N20.0 KIDNEY STONE: ICD-10-CM

## 2023-03-07 DIAGNOSIS — J45.20 MILD INTERMITTENT REACTIVE AIRWAY DISEASE WITHOUT COMPLICATION: ICD-10-CM

## 2023-03-07 DIAGNOSIS — R31.0 GROSS HEMATURIA: Primary | ICD-10-CM

## 2023-03-07 DIAGNOSIS — E66.01 MORBID OBESITY WITH BMI OF 45.0-49.9, ADULT (HCC): ICD-10-CM

## 2023-03-07 DIAGNOSIS — E11.9 CONTROLLED TYPE 2 DIABETES MELLITUS WITHOUT COMPLICATION, WITHOUT LONG-TERM CURRENT USE OF INSULIN (HCC): ICD-10-CM

## 2023-03-07 NOTE — PROGRESS NOTES
Ofelia Gan is a 59 y.o. female  Chief Complaint   Patient presents with    Blood in Urine     Patient here for blood in urine. 1. Have you been to the ER, urgent care clinic since your last visit? Hospitalized since your last visit? No    2. Have you seen or consulted any other health care providers outside of the [address] [place] [address] since your last visit? Include any pap smears or colon screening.  No

## 2023-03-08 LAB
APPEARANCE UR: ABNORMAL
BASOPHILS # BLD: 0 K/UL (ref 0–0.1)
BASOPHILS NFR BLD: 1 % (ref 0–1)
BILIRUB UR QL: NEGATIVE
COLOR UR: ABNORMAL
DIFFERENTIAL METHOD BLD: ABNORMAL
EOSINOPHIL # BLD: 0.1 K/UL (ref 0–0.4)
EOSINOPHIL NFR BLD: 1 % (ref 0–7)
ERYTHROCYTE [DISTWIDTH] IN BLOOD BY AUTOMATED COUNT: 13.6 % (ref 11.5–14.5)
GLUCOSE UR STRIP.AUTO-MCNC: NEGATIVE MG/DL
HCT VFR BLD AUTO: 39 % (ref 35–47)
HGB BLD-MCNC: 12.2 G/DL (ref 11.5–16)
HGB UR QL STRIP: ABNORMAL
IMM GRANULOCYTES # BLD AUTO: 0.1 K/UL (ref 0–0.04)
IMM GRANULOCYTES NFR BLD AUTO: 1 % (ref 0–0.5)
KETONES UR QL STRIP.AUTO: NEGATIVE MG/DL
LEUKOCYTE ESTERASE UR QL STRIP.AUTO: ABNORMAL
LYMPHOCYTES # BLD: 2.1 K/UL (ref 0.8–3.5)
LYMPHOCYTES NFR BLD: 31 % (ref 12–49)
MCH RBC QN AUTO: 27.4 PG (ref 26–34)
MCHC RBC AUTO-ENTMCNC: 31.3 G/DL (ref 30–36.5)
MCV RBC AUTO: 87.4 FL (ref 80–99)
MONOCYTES # BLD: 0.5 K/UL (ref 0–1)
MONOCYTES NFR BLD: 7 % (ref 5–13)
NEUTS SEG # BLD: 4.2 K/UL (ref 1.8–8)
NEUTS SEG NFR BLD: 59 % (ref 32–75)
NITRITE UR QL STRIP.AUTO: NEGATIVE
NRBC # BLD: 0 K/UL (ref 0–0.01)
NRBC BLD-RTO: 0 PER 100 WBC
PH UR STRIP: 8 (ref 5–8)
PLATELET # BLD AUTO: 263 K/UL (ref 150–400)
PMV BLD AUTO: 12.3 FL (ref 8.9–12.9)
PROT UR STRIP-MCNC: ABNORMAL MG/DL
RBC # BLD AUTO: 4.46 M/UL (ref 3.8–5.2)
SP GR UR REFRACTOMETRY: 1.02 (ref 1–1.03)
UROBILINOGEN UR QL STRIP.AUTO: 0.2 EU/DL (ref 0.2–1)
WBC # BLD AUTO: 7 K/UL (ref 3.6–11)

## 2023-03-12 PROBLEM — N20.0 KIDNEY STONE: Status: ACTIVE | Noted: 2023-03-12

## 2023-03-12 PROBLEM — R31.0 GROSS HEMATURIA: Status: ACTIVE | Noted: 2023-03-12

## 2023-03-13 NOTE — PROGRESS NOTES
[address] [place] [address] and Primary Care  [place] [address]  [address] [place] [address]  Phone:  [phone]  Fax: [phone]       Chief Complaint   Patient presents with    Blood in Urine     Patient here for blood in urine. .      SUBJECTIVE:    Soumya Hua is a 59 y.o. female  Dictation on: 03/12/2023 10:34 PM by: Jay Rangel [[id number]]          Current Outpatient Medications   Medication Sig Dispense Refill    metFORMIN ER (GLUCOPHAGE XR) 500 mg tablet TAKE 1 TABLET BY MOUTH with breakfast and 2 tablets with dinner. 270 Tablet 3    Wixela Inhub 100-50 mcg/dose diskus inhaler TAKE 1 PUFF BY MOUTH TWICE A DAY *RINSE MOUTH AFTER USE* 60 Each 11    albuterol (PROVENTIL VENTOLIN) 2.5 mg /3 mL (0.083 %) nebu 3 mL by Nebulization route every four (4) hours as needed for Wheezing. 75 Nebule 11    albuterol (PROVENTIL HFA, VENTOLIN HFA, PROAIR HFA) 90 mcg/actuation inhaler TAKE 2 PUFFS BY MOUTH EVERY 4 HOURS AS NEEDED FOR WHEEZE 6.7 Each 11    PARoxetine (PAXIL) 20 mg tablet TAKE 1 TABLET BY MOUTH EVERY DAY AT NIGHT 30 Tablet 11    naproxen (NAPROSYN) 500 mg tablet TAKE 1 TABLET BY MOUTH TWICE A DAY WITH MEALS 60 Tablet 11    valACYclovir (VALTREX) 1 gram tablet Take 0.5 Tabs by mouth daily. 30 Tab 11    loratadine-pseudoephedrine (Loratadine-D)  mg per tablet Take 1 tab at 7am and 3pm (Patient not taking: Reported on 3/7/2023) 30 Tablet 0    cefUROXime (CEFTIN) 500 mg tablet Take 1 Tab by mouth two (2) times a day. (Patient not taking: Reported on 3/7/2023) 14 Tab 0    loratadine (CLARITIN) 10 mg tablet Take 10 mg by mouth daily as needed for Allergies. (Patient not taking: Reported on 3/7/2023)      omeprazole (PRILOSEC) 40 mg capsule Take 1 Cap by mouth daily.  (Patient not taking: Reported on 3/7/2023) 30 Cap 11     Past Medical History:   Diagnosis Date    Arthritis     Asthma      Past Surgical History:   Procedure Laterality Date    HX CHOLECYSTECTOMY      HX COLONOSCOPY  4- tubulovillous adenoma---Dr.Christine Small    HX GI      HX GYN      s/p myomectomy, HAY and BSO     Allergies   Allergen Reactions    Aspirin Other (comments)     Bronchospasm         REVIEW OF SYSTEMS:  General: negative for - chills or fever  ENT: negative for - headaches, nasal congestion or tinnitus  Respiratory: negative for - cough, hemoptysis, shortness of breath or wheezing  Cardiovascular : negative for - chest pain, edema, palpitations or shortness of breath  Gastrointestinal: negative for - abdominal pain, blood in stools, heartburn or nausea/vomiting  Genito-Urinary: no dysuria, trouble voiding, or hematuria  Musculoskeletal: negative for - gait disturbance, joint pain, joint stiffness or joint swelling  Neurological: no TIA or stroke symptoms  Hematologic: no bruises, no bleeding, no swollen glands  Integument: no lumps, mole changes, nail changes or rash  Endocrine: no malaise/lethargy or unexpected weight changes      Social History     Socioeconomic History    Marital status: LEGALLY     Number of children: 0   Occupational History    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    Tobacco Use    Smoking status: Never    Smokeless tobacco: Never   Vaping Use    Vaping Use: Never used   Substance and Sexual Activity    Alcohol use: No    Drug use: No    Sexual activity: Yes     Family History   Problem Relation Age of Onset    Cancer Mother     Cancer Father         Prostate cancer    Diabetes Sister        OBJECTIVE:    Visit Vitals  /80   Pulse 79   Temp 97.9 °F (36.6 °C) (Oral)   Resp 18   Ht 5' 4\" (1.626 m)   Wt 271 lb (122.9 kg)   SpO2 97%   BMI 46.52 kg/m²     CONSTITUTIONAL: well , well nourished, appears age appropriate  EYES: perrla, eom intact  ENMT:moist mucous membranes, pharynx clear  NECK: supple.  Thyroid normal  RESPIRATORY: Chest: clear to ascultation and percussion   CARDIOVASCULAR: Heart: regular rate and rhythm  GASTROINTESTINAL: Abdomen: soft, bowel sounds active  HEMATOLOGIC: no pathological lymph nodes palpated  MUSCULOSKELETAL: Extremities: no edema, pulse 1+   INTEGUMENT: No unusual rashes or suspicious skin lesions noted. Nails appear normal.  NEUROLOGIC: non-focal exam   MENTAL STATUS: alert and oriented, appropriate affect      ASSESSMENT:  1. Gross hematuria    2. Kidney stone    3. Morbid obesity with BMI of 45.0-49.9, adult ([facility] [address]    4. Controlled type 2 diabetes mellitus without complication, without long-term current use of insulin ([facility] [address]    5. Mild intermittent reactive airway disease without complication        PLAN:   Dictation on: 03/12/2023  9:17 PM by: Shelton Melendez   . Orders Placed This Encounter    CT ABDOMEN W WO CONT AND PELVIS W CONT    URINALYSIS W/ RFLX MICROSCOPIC    CBC WITH AUTOMATED DIFF         Follow-up and Dispositions    Return in about 2 weeks (around 3/21/2023).            Jordan Laird MD

## 2023-03-21 ENCOUNTER — TRANSCRIBE ORDER (OUTPATIENT)
Dept: SCHEDULING | Age: 58
End: 2023-03-21

## 2023-03-21 ENCOUNTER — HOSPITAL ENCOUNTER (OUTPATIENT)
Dept: CT IMAGING | Age: 58
Discharge: HOME | End: 2023-03-21
Attending: INTERNAL MEDICINE
Payer: COMMERCIAL

## 2023-03-21 DIAGNOSIS — N20.0 URIC ACID NEPHROLITHIASIS: ICD-10-CM

## 2023-03-21 DIAGNOSIS — K31.89 GASTRIC MASS: Primary | ICD-10-CM

## 2023-03-21 DIAGNOSIS — N20.0 KIDNEY STONE: ICD-10-CM

## 2023-03-21 DIAGNOSIS — R31.0 GROSS HEMATURIA: ICD-10-CM

## 2023-03-21 DIAGNOSIS — R31.0 GROSS HEMATURIA: Primary | ICD-10-CM

## 2023-03-21 LAB — CREAT BLD-MCNC: 0.7 MG/DL (ref 0.6–1.3)

## 2023-03-21 PROCEDURE — 74011000636 HC RX REV CODE- 636: Performed by: INTERNAL MEDICINE

## 2023-03-21 PROCEDURE — 74178 CT ABD&PLV WO CNTR FLWD CNTR: CPT

## 2023-03-21 PROCEDURE — 82565 ASSAY OF CREATININE: CPT

## 2023-03-21 RX ADMIN — IOPAMIDOL 100 ML: 755 INJECTION, SOLUTION INTRAVENOUS at 11:00

## 2023-03-22 ENCOUNTER — TELEPHONE (OUTPATIENT)
Dept: INTERNAL MEDICINE CLINIC | Age: 58
End: 2023-03-22

## 2023-03-22 NOTE — TELEPHONE ENCOUNTER
----- Message from Carnell Goltz, MD sent at 3/21/2023 10:04 PM EDT -----  Tell patient she needs to see a gastroenterologist because of 2 lesions in her stomach

## 2023-03-23 ENCOUNTER — OFFICE VISIT (OUTPATIENT)
Dept: INTERNAL MEDICINE CLINIC | Age: 58
End: 2023-03-23
Payer: COMMERCIAL

## 2023-03-23 VITALS
TEMPERATURE: 98 F | SYSTOLIC BLOOD PRESSURE: 129 MMHG | HEART RATE: 76 BPM | WEIGHT: 276.9 LBS | HEIGHT: 64 IN | BODY MASS INDEX: 47.27 KG/M2 | DIASTOLIC BLOOD PRESSURE: 85 MMHG | OXYGEN SATURATION: 97 % | RESPIRATION RATE: 18 BRPM

## 2023-03-23 DIAGNOSIS — K31.89 GASTRIC MASS: Primary | ICD-10-CM

## 2023-03-23 DIAGNOSIS — E66.01 MORBID OBESITY WITH BMI OF 45.0-49.9, ADULT (HCC): ICD-10-CM

## 2023-03-23 DIAGNOSIS — E11.9 CONTROLLED TYPE 2 DIABETES MELLITUS WITHOUT COMPLICATION, WITHOUT LONG-TERM CURRENT USE OF INSULIN (HCC): ICD-10-CM

## 2023-03-23 PROCEDURE — 99213 OFFICE O/P EST LOW 20 MIN: CPT | Performed by: INTERNAL MEDICINE

## 2023-03-23 NOTE — PROGRESS NOTES
Chief Complaint   Patient presents with    Follow-up    Diabetes     Patient here for follow up diabetes. .      SUBECTIVE:    Fidel Guevara is a 59 y.o. female  Dictation on: 03/23/2023 10:37 AM by: Alfonso Zavala [[id number]]     Current Outpatient Medications   Medication Sig Dispense Refill    metFORMIN ER (GLUCOPHAGE XR) 500 mg tablet TAKE 1 TABLET BY MOUTH with breakfast and 2 tablets with dinner. 270 Tablet 3    Wixela Inhub 100-50 mcg/dose diskus inhaler TAKE 1 PUFF BY MOUTH TWICE A DAY *RINSE MOUTH AFTER USE* 60 Each 11    albuterol (PROVENTIL VENTOLIN) 2.5 mg /3 mL (0.083 %) nebu 3 mL by Nebulization route every four (4) hours as needed for Wheezing. 75 Nebule 11    albuterol (PROVENTIL HFA, VENTOLIN HFA, PROAIR HFA) 90 mcg/actuation inhaler TAKE 2 PUFFS BY MOUTH EVERY 4 HOURS AS NEEDED FOR WHEEZE 6.7 Each 11    PARoxetine (PAXIL) 20 mg tablet TAKE 1 TABLET BY MOUTH EVERY DAY AT NIGHT 30 Tablet 11    naproxen (NAPROSYN) 500 mg tablet TAKE 1 TABLET BY MOUTH TWICE A DAY WITH MEALS 60 Tablet 11    valACYclovir (VALTREX) 1 gram tablet Take 0.5 Tabs by mouth daily. 30 Tab 11    loratadine-pseudoephedrine (Loratadine-D)  mg per tablet Take 1 tab at 7am and 3pm (Patient not taking: No sig reported) 30 Tablet 0    cefUROXime (CEFTIN) 500 mg tablet Take 1 Tab by mouth two (2) times a day. (Patient not taking: No sig reported) 14 Tab 0    loratadine (CLARITIN) 10 mg tablet Take 10 mg by mouth daily as needed for Allergies. (Patient not taking: No sig reported)      omeprazole (PRILOSEC) 40 mg capsule Take 1 Cap by mouth daily.  (Patient not taking: No sig reported) 30 Cap 11     Past Medical History:   Diagnosis Date    Arthritis     Asthma      Past Surgical History:   Procedure Laterality Date    HX CHOLECYSTECTOMY      HX COLONOSCOPY  4-    tubulovillous adenoma---Dr.Christine Small    HX GI      HX GYN      s/p myomectomy, HAY and BSO     Allergies   Allergen Reactions    Aspirin Other (comments)     Bronchospasm       REVIEW OF SYSTEMS:  Review of Systems - Negative except   ENT ROS: negative for - headaches, hearing change, nasal congestion, oral lesions, tinnitus, visual changes or   Respiratory ROS: no cough, shortness of breath, or wheezing  Cardiovascular ROS: no chest pain or dyspnea on exertion  Gastrointestinal ROS: no abdominal pain, change in bowel habits, or black or blood  Genito-Urinary ROS: no dysuria, trouble voiding, or hematuria  Musculoskeletal ROS: negative  Neurological ROS: no TIA or stroke symptoms      Social History     Socioeconomic History    Marital status: LEGALLY     Number of children: 0   Occupational History    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    Tobacco Use    Smoking status: Never    Smokeless tobacco: Never   Vaping Use    Vaping Use: Never used   Substance and Sexual Activity    Alcohol use: No    Drug use: No    Sexual activity: Yes     Social Determinants of Health     Financial Resource Strain: Medium Risk    Difficulty of Paying Living Expenses: Somewhat hard   Food Insecurity: Food Insecurity Present    Worried About Running Out of Food in the Last Year: Never true    Ran Out of Food in the Last Year: Sometimes true   Transportation Needs: No Transportation Needs    Lack of Transportation (Medical): No    Lack of Transportation (Non-Medical): No   Housing Stability: Unknown    Unstable Housing in the Last Year: No   r  Family History   Problem Relation Age of Onset    Cancer Mother     Cancer Father         Prostate cancer    Diabetes Sister        OBJECTIVE:  Visit Vitals  /85   Pulse 76   Temp 98 °F (36.7 °C) (Oral)   Resp 18   Ht 5' 4\" (1.626 m)   Wt 276 lb 14.4 oz (125.6 kg)   SpO2 97%   BMI 47.53 kg/m²     ENT: perrla,  eom intact  NECK: supple.  Thyroid normal, no JVD  CHEST: clear to ascultation and percussion   HEART: regular rate and rhythm  ABD: soft, bowel sounds active, EXTREMITIES: no edema, pulse 1+  INTEGUMENT: clear      ASSESSMENT:  1. Gastric mass    2. Controlled type 2 diabetes mellitus without complication, without long-term current use of insulin ([facility] [address]    3. Morbid obesity with BMI of 45.0-49.9, adult [facility])        PLAN:   Dictation on: 03/23/2023 10:41 AM by: Shravan Blair [[id number]]         Follow-up and Dispositions    Return in about 3 months (around 6/23/2023).            Promise Mooney MD

## 2023-03-23 NOTE — PROGRESS NOTES
Aure Frances is a 59 y.o. female  Chief Complaint   Patient presents with    Follow-up    Diabetes     Patient here for follow up diabetes. YES Answers must have Comments  1. \"Have you been to the ER, urgent care clinic since your last visit? Hospitalized since your last visit? \"    [] YES   [x] NO       2. Have you seen or consulted any other health care providers outside of [address] [place] [address] since your last visit?     [] YES   [x] NO       3. For patients aged 39-70: Have you had a colorectal cancer screening such as a colonoscopy/FIT/Cologuard? Nurse/CMA to request records if not in chart   [] YES   [x] NO   [] NA, based on age    If the patient is female:      4. For female patients aged 41-77: Kristel Zaldivar you had a mammogram in the last two years?  Nurse/CMA to request records if not in chart   [] YES   [x] NO   [] NA, based on age    11. For female patients aged 21-65: Kristel Zaldivar you had a pap smear?   Nurse/CMA to request records if not in chart   [] YES   [x] NO  [] NA, based on age

## 2023-03-24 NOTE — PROGRESS NOTES
1.  The patient has two gastric masses on her stomach which are intramural suggesting the possibility of leiomyoma or a GIST tumor. She will obviously have to be seen by Gastroenterology for biopsy/removal.  I talked to her at length about this and she agrees. 2. Her diabetes historically has been doing quite well. 3. She really needs to lose weight as we have discussed, but obviously this is not necessarily the right time to push this issue.

## 2023-03-24 NOTE — PROGRESS NOTES
The patient comes in so I can explain to her what needs to be done. She had an incidental finding two lesions on her stomach. CT scan was done because of the possibility of kidney stones which was not the case. There were two very distinct lesions on her stomach that need further evaluation. She has had no GI symptoms. She has a past history of primary hypertension, dyslipidemia and diabetes mellitus.

## 2023-04-10 ENCOUNTER — OFFICE VISIT (OUTPATIENT)
Dept: INTERNAL MEDICINE CLINIC | Age: 58
End: 2023-04-10

## 2023-04-10 VITALS
BODY MASS INDEX: 45.43 KG/M2 | HEART RATE: 69 BPM | TEMPERATURE: 98.5 F | HEIGHT: 64 IN | DIASTOLIC BLOOD PRESSURE: 79 MMHG | RESPIRATION RATE: 20 BRPM | SYSTOLIC BLOOD PRESSURE: 127 MMHG | WEIGHT: 266.1 LBS | OXYGEN SATURATION: 100 %

## 2023-04-10 DIAGNOSIS — J06.9 UPPER RESPIRATORY TRACT INFECTION, UNSPECIFIED TYPE: Primary | ICD-10-CM

## 2023-04-10 DIAGNOSIS — K31.89 GASTRIC MASS: ICD-10-CM

## 2023-04-10 DIAGNOSIS — J45.20 MILD INTERMITTENT REACTIVE AIRWAY DISEASE WITHOUT COMPLICATION: ICD-10-CM

## 2023-04-10 DIAGNOSIS — E66.01 MORBID OBESITY WITH BMI OF 45.0-49.9, ADULT (HCC): ICD-10-CM

## 2023-04-10 DIAGNOSIS — E11.9 CONTROLLED TYPE 2 DIABETES MELLITUS WITHOUT COMPLICATION, WITHOUT LONG-TERM CURRENT USE OF INSULIN (HCC): ICD-10-CM

## 2023-04-19 RX ORDER — PAROXETINE HYDROCHLORIDE 20 MG/1
TABLET, FILM COATED ORAL
Qty: 30 TABLET | Refills: 11 | Status: SHIPPED | OUTPATIENT
Start: 2023-04-19

## 2023-04-28 ENCOUNTER — ANESTHESIA EVENT (OUTPATIENT)
Dept: SURGERY | Age: 58
End: 2023-04-28
Payer: COMMERCIAL

## 2023-05-02 ENCOUNTER — ANESTHESIA (OUTPATIENT)
Dept: SURGERY | Age: 58
End: 2023-05-02
Payer: COMMERCIAL

## 2023-05-02 ENCOUNTER — HOSPITAL ENCOUNTER (OUTPATIENT)
Age: 58
Setting detail: OUTPATIENT SURGERY
Discharge: HOME | End: 2023-05-02
Attending: INTERNAL MEDICINE | Admitting: INTERNAL MEDICINE
Payer: COMMERCIAL

## 2023-05-02 VITALS
WEIGHT: 266 LBS | OXYGEN SATURATION: 100 % | BODY MASS INDEX: 45.41 KG/M2 | HEIGHT: 64 IN | RESPIRATION RATE: 17 BRPM | TEMPERATURE: 98.7 F | DIASTOLIC BLOOD PRESSURE: 77 MMHG | HEART RATE: 65 BPM | SYSTOLIC BLOOD PRESSURE: 140 MMHG

## 2023-05-02 LAB
GLUCOSE BLD STRIP.AUTO-MCNC: 116 MG/DL (ref 65–117)
SERVICE CMNT-IMP: NORMAL

## 2023-05-02 PROCEDURE — 76210000020 HC REC RM PH II FIRST 0.5 HR: Performed by: INTERNAL MEDICINE

## 2023-05-02 PROCEDURE — 76210000063 HC OR PH I REC FIRST 0.5 HR: Performed by: INTERNAL MEDICINE

## 2023-05-02 PROCEDURE — 74011000250 HC RX REV CODE- 250: Performed by: INTERNAL MEDICINE

## 2023-05-02 PROCEDURE — 2709999900 HC NON-CHARGEABLE SUPPLY: Performed by: INTERNAL MEDICINE

## 2023-05-02 PROCEDURE — 82962 GLUCOSE BLOOD TEST: CPT

## 2023-05-02 PROCEDURE — 88305 TISSUE EXAM BY PATHOLOGIST: CPT

## 2023-05-02 PROCEDURE — 77030019988 HC FCPS ENDOSC DISP BSC -B: Performed by: INTERNAL MEDICINE

## 2023-05-02 PROCEDURE — 76060000032 HC ANESTHESIA 0.5 TO 1 HR: Performed by: INTERNAL MEDICINE

## 2023-05-02 PROCEDURE — 74011250636 HC RX REV CODE- 250/636: Performed by: ANESTHESIOLOGY

## 2023-05-02 PROCEDURE — 76010000138 HC OR TIME 0.5 TO 1 HR: Performed by: INTERNAL MEDICINE

## 2023-05-02 RX ORDER — ATROPINE SULFATE 0.1 MG/ML
0.5 INJECTION INTRAVENOUS
Status: DISCONTINUED | OUTPATIENT
Start: 2023-05-02 | End: 2023-05-02 | Stop reason: HOSPADM

## 2023-05-02 RX ORDER — SODIUM CHLORIDE 0.9 % (FLUSH) 0.9 %
5-40 SYRINGE (ML) INJECTION EVERY 8 HOURS
Status: DISCONTINUED | OUTPATIENT
Start: 2023-05-02 | End: 2023-05-02 | Stop reason: HOSPADM

## 2023-05-02 RX ORDER — SODIUM CHLORIDE 9 MG/ML
100 INJECTION, SOLUTION INTRAVENOUS CONTINUOUS
Status: DISCONTINUED | OUTPATIENT
Start: 2023-05-02 | End: 2023-05-02 | Stop reason: HOSPADM

## 2023-05-02 RX ORDER — PROPOFOL 10 MG/ML
INJECTION, EMULSION INTRAVENOUS AS NEEDED
Status: DISCONTINUED | OUTPATIENT
Start: 2023-05-02 | End: 2023-05-02 | Stop reason: HOSPADM

## 2023-05-02 RX ORDER — SODIUM CHLORIDE, SODIUM LACTATE, POTASSIUM CHLORIDE, CALCIUM CHLORIDE 600; 310; 30; 20 MG/100ML; MG/100ML; MG/100ML; MG/100ML
INJECTION, SOLUTION INTRAVENOUS
Status: DISCONTINUED | OUTPATIENT
Start: 2023-05-02 | End: 2023-05-02 | Stop reason: HOSPADM

## 2023-05-02 RX ORDER — SODIUM CHLORIDE, SODIUM LACTATE, POTASSIUM CHLORIDE, CALCIUM CHLORIDE 600; 310; 30; 20 MG/100ML; MG/100ML; MG/100ML; MG/100ML
50 INJECTION, SOLUTION INTRAVENOUS CONTINUOUS
Status: DISCONTINUED | OUTPATIENT
Start: 2023-05-02 | End: 2023-05-02 | Stop reason: HOSPADM

## 2023-05-02 RX ORDER — DEXTROSE MONOHYDRATE AND SODIUM CHLORIDE 5; .9 G/100ML; G/100ML
100 INJECTION, SOLUTION INTRAVENOUS CONTINUOUS
Status: DISCONTINUED | OUTPATIENT
Start: 2023-05-02 | End: 2023-05-02 | Stop reason: HOSPADM

## 2023-05-02 RX ORDER — FENTANYL CITRATE 50 UG/ML
100 INJECTION, SOLUTION INTRAMUSCULAR; INTRAVENOUS ONCE
Status: DISCONTINUED | OUTPATIENT
Start: 2023-05-02 | End: 2023-05-02 | Stop reason: HOSPADM

## 2023-05-02 RX ORDER — SODIUM CHLORIDE 0.9 % (FLUSH) 0.9 %
5-40 SYRINGE (ML) INJECTION AS NEEDED
Status: DISCONTINUED | OUTPATIENT
Start: 2023-05-02 | End: 2023-05-02 | Stop reason: HOSPADM

## 2023-05-02 RX ORDER — MIDAZOLAM HYDROCHLORIDE 1 MG/ML
5 INJECTION, SOLUTION INTRAMUSCULAR; INTRAVENOUS
Status: DISCONTINUED | OUTPATIENT
Start: 2023-05-02 | End: 2023-05-02 | Stop reason: HOSPADM

## 2023-05-02 RX ORDER — DEXTROMETHORPHAN/PSEUDOEPHED 2.5-7.5/.8
1.2 DROPS ORAL
Status: DISCONTINUED | OUTPATIENT
Start: 2023-05-02 | End: 2023-05-02 | Stop reason: HOSPADM

## 2023-05-02 RX ORDER — EPINEPHRINE 0.1 MG/ML
1 INJECTION INTRACARDIAC; INTRAVENOUS
Status: DISCONTINUED | OUTPATIENT
Start: 2023-05-02 | End: 2023-05-02 | Stop reason: HOSPADM

## 2023-05-02 RX ORDER — ATROPINE SULFATE 0.4 MG/ML
INJECTION, SOLUTION ENDOTRACHEAL; INTRAMEDULLARY; INTRAMUSCULAR; INTRAVENOUS; SUBCUTANEOUS ONCE
Status: CANCELLED | OUTPATIENT
Start: 2023-05-02 | End: 2023-05-02

## 2023-05-02 RX ORDER — LIDOCAINE HYDROCHLORIDE 20 MG/ML
5 SOLUTION OROPHARYNGEAL AS NEEDED
Status: DISCONTINUED | OUTPATIENT
Start: 2023-05-02 | End: 2023-05-02 | Stop reason: HOSPADM

## 2023-05-02 RX ORDER — SODIUM CHLORIDE 9 MG/ML
50 INJECTION, SOLUTION INTRAVENOUS CONTINUOUS
Status: DISCONTINUED | OUTPATIENT
Start: 2023-05-02 | End: 2023-05-02 | Stop reason: HOSPADM

## 2023-05-02 RX ORDER — FLUMAZENIL 0.1 MG/ML
0.2 INJECTION INTRAVENOUS
Status: DISCONTINUED | OUTPATIENT
Start: 2023-05-02 | End: 2023-05-02 | Stop reason: HOSPADM

## 2023-05-02 RX ORDER — NALOXONE HYDROCHLORIDE 0.4 MG/ML
0.4 INJECTION, SOLUTION INTRAMUSCULAR; INTRAVENOUS; SUBCUTANEOUS
Status: DISCONTINUED | OUTPATIENT
Start: 2023-05-02 | End: 2023-05-02 | Stop reason: HOSPADM

## 2023-05-02 RX ADMIN — PROPOFOL 20 MG: 10 INJECTION, EMULSION INTRAVENOUS at 11:36

## 2023-05-02 RX ADMIN — PROPOFOL 80 MG: 10 INJECTION, EMULSION INTRAVENOUS at 11:22

## 2023-05-02 RX ADMIN — PROPOFOL 10 MG: 10 INJECTION, EMULSION INTRAVENOUS at 11:49

## 2023-05-02 RX ADMIN — PROPOFOL 10 MG: 10 INJECTION, EMULSION INTRAVENOUS at 11:47

## 2023-05-02 RX ADMIN — PROPOFOL 10 MG: 10 INJECTION, EMULSION INTRAVENOUS at 11:45

## 2023-05-02 RX ADMIN — PROPOFOL 20 MG: 10 INJECTION, EMULSION INTRAVENOUS at 11:23

## 2023-05-02 RX ADMIN — PROPOFOL 10 MG: 10 INJECTION, EMULSION INTRAVENOUS at 11:48

## 2023-05-02 RX ADMIN — PROPOFOL 20 MG: 10 INJECTION, EMULSION INTRAVENOUS at 11:33

## 2023-05-02 RX ADMIN — PROPOFOL 20 MG: 10 INJECTION, EMULSION INTRAVENOUS at 11:35

## 2023-05-02 RX ADMIN — PROPOFOL 10 MG: 10 INJECTION, EMULSION INTRAVENOUS at 11:41

## 2023-05-02 RX ADMIN — PROPOFOL 10 MG: 10 INJECTION, EMULSION INTRAVENOUS at 11:31

## 2023-05-02 RX ADMIN — PROPOFOL 10 MG: 10 INJECTION, EMULSION INTRAVENOUS at 11:25

## 2023-05-02 RX ADMIN — PROPOFOL 10 MG: 10 INJECTION, EMULSION INTRAVENOUS at 11:32

## 2023-05-02 RX ADMIN — PROPOFOL 10 MG: 10 INJECTION, EMULSION INTRAVENOUS at 11:44

## 2023-05-02 RX ADMIN — PROPOFOL 20 MG: 10 INJECTION, EMULSION INTRAVENOUS at 11:37

## 2023-05-02 RX ADMIN — PROPOFOL 10 MG: 10 INJECTION, EMULSION INTRAVENOUS at 11:26

## 2023-05-02 RX ADMIN — PROPOFOL 10 MG: 10 INJECTION, EMULSION INTRAVENOUS at 11:29

## 2023-05-02 RX ADMIN — PROPOFOL 10 MG: 10 INJECTION, EMULSION INTRAVENOUS at 11:43

## 2023-05-02 RX ADMIN — PROPOFOL 10 MG: 10 INJECTION, EMULSION INTRAVENOUS at 11:30

## 2023-05-02 RX ADMIN — PROPOFOL 10 MG: 10 INJECTION, EMULSION INTRAVENOUS at 11:40

## 2023-05-02 RX ADMIN — PROPOFOL 20 MG: 10 INJECTION, EMULSION INTRAVENOUS at 11:38

## 2023-05-02 RX ADMIN — SODIUM CHLORIDE, POTASSIUM CHLORIDE, SODIUM LACTATE AND CALCIUM CHLORIDE: 600; 310; 30; 20 INJECTION, SOLUTION INTRAVENOUS at 11:02

## 2023-05-02 RX ADMIN — PROPOFOL 10 MG: 10 INJECTION, EMULSION INTRAVENOUS at 11:27

## 2023-05-02 RX ADMIN — PROPOFOL 10 MG: 10 INJECTION, EMULSION INTRAVENOUS at 11:28

## 2023-05-02 RX ADMIN — PROPOFOL 20 MG: 10 INJECTION, EMULSION INTRAVENOUS at 11:24

## 2023-05-02 RX ADMIN — PROPOFOL 10 MG: 10 INJECTION, EMULSION INTRAVENOUS at 11:39

## 2023-05-02 RX ADMIN — PROPOFOL 10 MG: 10 INJECTION, EMULSION INTRAVENOUS at 11:42

## 2023-05-02 RX ADMIN — PROPOFOL 20 MG: 10 INJECTION, EMULSION INTRAVENOUS at 11:34

## 2023-05-02 RX ADMIN — PROPOFOL 10 MG: 10 INJECTION, EMULSION INTRAVENOUS at 11:46

## 2023-05-10 RX ORDER — METFORMIN HYDROCHLORIDE 500 MG/1
TABLET, EXTENDED RELEASE ORAL
Qty: 90 TABLET | Refills: 3 | Status: SHIPPED | OUTPATIENT
Start: 2023-05-10

## 2023-05-29 RX ORDER — LORATADINE 10 MG/1
10 TABLET ORAL DAILY PRN
COMMUNITY

## 2023-05-29 RX ORDER — CEFUROXIME AXETIL 500 MG/1
500 TABLET ORAL 2 TIMES DAILY
COMMUNITY
Start: 2020-02-04

## 2023-05-29 RX ORDER — FLUTICASONE PROPIONATE AND SALMETEROL 100; 50 UG/1; UG/1
POWDER RESPIRATORY (INHALATION)
COMMUNITY
Start: 2022-08-04

## 2023-05-29 RX ORDER — ALBUTEROL SULFATE 90 UG/1
AEROSOL, METERED RESPIRATORY (INHALATION)
COMMUNITY
Start: 2022-04-15

## 2023-05-29 RX ORDER — NAPROXEN 500 MG/1
1 TABLET ORAL 2 TIMES DAILY WITH MEALS
COMMUNITY
Start: 2021-07-25

## 2023-05-29 RX ORDER — ALBUTEROL SULFATE 2.5 MG/3ML
2.5 SOLUTION RESPIRATORY (INHALATION) EVERY 4 HOURS PRN
COMMUNITY
Start: 2022-06-17 | End: 2023-06-20

## 2023-05-29 RX ORDER — VALACYCLOVIR HYDROCHLORIDE 1 G/1
500 TABLET, FILM COATED ORAL DAILY
COMMUNITY
Start: 2017-10-09

## 2023-05-29 RX ORDER — PAROXETINE HYDROCHLORIDE 20 MG/1
1 TABLET, FILM COATED ORAL NIGHTLY
COMMUNITY
Start: 2023-04-19

## 2023-05-29 RX ORDER — OMEPRAZOLE 40 MG/1
40 CAPSULE, DELAYED RELEASE ORAL DAILY
COMMUNITY
Start: 2017-06-26

## 2023-06-19 DIAGNOSIS — J45.20 MILD INTERMITTENT ASTHMA, UNCOMPLICATED: ICD-10-CM

## 2023-06-20 RX ORDER — ALBUTEROL SULFATE 2.5 MG/3ML
SOLUTION RESPIRATORY (INHALATION)
Qty: 75 ML | Refills: 11 | Status: SHIPPED | OUTPATIENT
Start: 2023-06-20

## 2023-09-04 DIAGNOSIS — J45.20 MILD INTERMITTENT ASTHMA, UNCOMPLICATED: ICD-10-CM

## 2023-09-05 RX ORDER — FLUTICASONE PROPIONATE AND SALMETEROL 100; 50 UG/1; UG/1
POWDER RESPIRATORY (INHALATION)
Qty: 60 EACH | Refills: 11 | Status: SHIPPED | OUTPATIENT
Start: 2023-09-05

## 2023-10-10 LAB — MAMMOGRAPHY, EXTERNAL: NORMAL

## 2023-10-24 ENCOUNTER — TELEPHONE (OUTPATIENT)
Facility: CLINIC | Age: 58
End: 2023-10-24

## 2023-10-24 NOTE — TELEPHONE ENCOUNTER
attempted to call patient no answer left message on her VM due for DM follow up and to call us back [phone] or call the office directly [phone].

## 2024-02-14 ENCOUNTER — OFFICE VISIT (OUTPATIENT)
Facility: CLINIC | Age: 59
End: 2024-02-14
Payer: COMMERCIAL

## 2024-02-14 VITALS
DIASTOLIC BLOOD PRESSURE: 78 MMHG | TEMPERATURE: 98 F | BODY MASS INDEX: 49.35 KG/M2 | RESPIRATION RATE: 18 BRPM | OXYGEN SATURATION: 98 % | HEIGHT: 64 IN | HEART RATE: 81 BPM | SYSTOLIC BLOOD PRESSURE: 138 MMHG | WEIGHT: 289.1 LBS

## 2024-02-14 DIAGNOSIS — R53.81 PHYSICAL DECONDITIONING: ICD-10-CM

## 2024-02-14 DIAGNOSIS — I87.2 VENOUS INSUFFICIENCY: ICD-10-CM

## 2024-02-14 DIAGNOSIS — E11.9 CONTROLLED TYPE 2 DIABETES MELLITUS WITHOUT COMPLICATION, WITHOUT LONG-TERM CURRENT USE OF INSULIN (HCC): ICD-10-CM

## 2024-02-14 DIAGNOSIS — J45.20 MILD INTERMITTENT ASTHMA WITHOUT COMPLICATION: Primary | ICD-10-CM

## 2024-02-14 DIAGNOSIS — F32.9 REACTIVE DEPRESSION: ICD-10-CM

## 2024-02-14 DIAGNOSIS — E66.01 MORBID OBESITY WITH BMI OF 45.0-49.9, ADULT (HCC): ICD-10-CM

## 2024-02-14 PROCEDURE — 3052F HG A1C>EQUAL 8.0%<EQUAL 9.0%: CPT | Performed by: INTERNAL MEDICINE

## 2024-02-14 PROCEDURE — 99214 OFFICE O/P EST MOD 30 MIN: CPT | Performed by: INTERNAL MEDICINE

## 2024-02-14 RX ORDER — ALBUTEROL SULFATE 90 UG/1
AEROSOL, METERED RESPIRATORY (INHALATION)
Qty: 18 G | Refills: 5 | Status: SHIPPED | OUTPATIENT
Start: 2024-02-14

## 2024-02-14 NOTE — PROGRESS NOTES
Chief Complaint   Patient presents with    Follow-up    Diabetes     \"Have you been to the ER, urgent care clinic since your last visit?  Hospitalized since your last visit?\"    NO    “Have you seen or consulted any other health care providers outside of [facility] since your last visit?”    NO        “Have you had a pap smear?”    NO

## 2024-02-14 NOTE — PROGRESS NOTES
[facility]  [address]  [address]  [place] [address]  Phone:  [phone]  Fax: [phone]       Chief Complaint   Patient presents with    Follow-up    Diabetes   .      SUBJECTIVE:    Jennifer Ovalle is a 59 y.o. female  Dictation on: 02/14/2024  3:15 PM by: ALESSANDRO BRUMFIELD [[id number]]          Current Outpatient Medications   Medication Sig Dispense Refill    albuterol sulfate HFA (PROVENTIL;VENTOLIN;PROAIR) 108 (90 Base) MCG/ACT inhaler TAKE 2 PUFFS BY MOUTH EVERY 4 HOURS AS NEEDED FOR WHEEZE 18 g 5    WIXELA INHUB 100-50 MCG/ACT AEPB diskus inhaler TAKE 1 PUFF BY MOUTH TWICE A DAY *RINSE MOUTH AFTER USE* 60 each 11    albuterol (PROVENTIL) (2.5 MG/3ML) 0.083% nebulizer solution 3 ML BY NEBULIZATION ROUTE EVERY FOUR (4) HOURS AS NEEDED FOR WHEEZING. 75 mL 11    loratadine (CLARITIN) 10 MG tablet Take 1 tablet by mouth daily as needed      loratadine-pseudoephedrine (CLARITIN-D 24HR)  MG per extended release tablet Take 1 tab at 7am and 3pm      naproxen (NAPROSYN) 500 MG tablet Take 1 tablet by mouth 2 times daily (with meals)      PARoxetine (PAXIL) 20 MG tablet Take 1 tablet by mouth nightly      valACYclovir (VALTREX) 1 g tablet Take 0.5 tablets by mouth daily      metFORMIN (GLUCOPHAGE-XR) 500 MG extended release tablet TAKE 1 TAB BY MOUTH DAILY (WITH DINNER). 90 tablet 3     Current Facility-Administered Medications   Medication Dose Route Frequency Provider Last Rate Last Admin    Tirzepatide (MOUNJARO) SC injection 2.5 mg  2.5 mg SubCUTAneous Weekly Alessandro Brumfield MD         Past Medical History:   Diagnosis Date    Arthritis     Asthma      Past Surgical History:   Procedure Laterality Date    CHOLECYSTECTOMY      COLONOSCOPY N/A 5/2/2023    COLONOSCOPY performed by Tong Solorio MD at [facility] MAIN OR    COLONOSCOPY  4-    tubulovillous adenoma---Dr.Christine Roemro    GI      GYN      s/p myomectomy, REECE and BSO     Allergies   Allergen

## 2024-02-15 LAB
EST. AVERAGE GLUCOSE BLD GHB EST-MCNC: 186 MG/DL
HBA1C MFR BLD: 8.1 % (ref 4–5.6)

## 2024-02-15 NOTE — PROGRESS NOTES
Comes in for return visit, stating that she has done reasonably well. I have not seen her for the last eight months. She had GI Problems, but this resolved with no major pathological findings noted.      She continues to gain a considerable amount of weight and is fasting approaching 200 pounds.    She has a past history of asthma, as well as diabetes mellitus.

## 2024-02-16 ENCOUNTER — TELEPHONE (OUTPATIENT)
Facility: CLINIC | Age: 59
End: 2024-02-16

## 2024-02-16 DIAGNOSIS — E11.649 UNCONTROLLED TYPE 2 DIABETES MELLITUS WITH HYPOGLYCEMIA WITHOUT COMA (HCC): Primary | ICD-10-CM

## 2024-02-16 NOTE — TELEPHONE ENCOUNTER
----- Message from Alessandro Armijo MD sent at 2/16/2024  2:18 AM EST -----  Patient's diabetes is out of control therefore I would suggest a GLP-1 injection weekly to improve diabetic control and promote significant weight loss since patient is morbidly obese.

## 2024-02-16 NOTE — TELEPHONE ENCOUNTER
Patient notified of out of control diabetes and the new rx sent into pharmacy for mounjaro, she is ask to call office if not approved

## 2024-04-26 RX ORDER — PAROXETINE HYDROCHLORIDE 20 MG/1
20 TABLET, FILM COATED ORAL NIGHTLY
Qty: 30 TABLET | Refills: 11 | Status: SHIPPED | OUTPATIENT
Start: 2024-04-26

## 2024-05-17 ENCOUNTER — OFFICE VISIT (OUTPATIENT)
Facility: CLINIC | Age: 59
End: 2024-05-17
Payer: COMMERCIAL

## 2024-05-17 DIAGNOSIS — E11.9 CONTROLLED TYPE 2 DIABETES MELLITUS WITHOUT COMPLICATION, WITHOUT LONG-TERM CURRENT USE OF INSULIN (HCC): ICD-10-CM

## 2024-05-17 DIAGNOSIS — R53.81 PHYSICAL DECONDITIONING: ICD-10-CM

## 2024-05-17 DIAGNOSIS — E66.01 MORBID OBESITY WITH BMI OF 45.0-49.9, ADULT (HCC): Primary | ICD-10-CM

## 2024-05-17 DIAGNOSIS — H91.93 DECREASED HEARING OF BOTH EARS: ICD-10-CM

## 2024-05-17 DIAGNOSIS — E78.5 DYSLIPIDEMIA: ICD-10-CM

## 2024-05-17 DIAGNOSIS — I87.2 VENOUS INSUFFICIENCY: ICD-10-CM

## 2024-05-17 DIAGNOSIS — R03.0 WHITE COAT SYNDROME WITH HIGH BLOOD PRESSURE BUT WITHOUT HYPERTENSION: ICD-10-CM

## 2024-05-17 DIAGNOSIS — J45.20 MILD INTERMITTENT REACTIVE AIRWAY DISEASE WITHOUT COMPLICATION: ICD-10-CM

## 2024-05-17 DIAGNOSIS — I10 PRIMARY HYPERTENSION: ICD-10-CM

## 2024-05-17 LAB
ALBUMIN SERPL-MCNC: 4.4 G/DL (ref 3.5–5)
ALBUMIN/GLOB SERPL: 1.6 (ref 1.1–2.2)
ALP SERPL-CCNC: 65 U/L (ref 45–117)
ALT SERPL-CCNC: 32 U/L (ref 12–78)
ANION GAP SERPL CALC-SCNC: 5 MMOL/L (ref 5–15)
APPEARANCE UR: CLEAR
AST SERPL-CCNC: 22 U/L (ref 15–37)
BACTERIA URNS QL MICRO: NEGATIVE /HPF
BASOPHILS # BLD: 0 K/UL (ref 0–0.1)
BASOPHILS NFR BLD: 1 % (ref 0–1)
BILIRUB SERPL-MCNC: 0.5 MG/DL (ref 0.2–1)
BILIRUB UR QL: NEGATIVE
BUN SERPL-MCNC: 14 MG/DL (ref 6–20)
BUN/CREAT SERPL: 18 (ref 12–20)
CALCIUM SERPL-MCNC: 10.3 MG/DL (ref 8.5–10.1)
CHLORIDE SERPL-SCNC: 107 MMOL/L (ref 97–108)
CHOLEST SERPL-MCNC: 163 MG/DL
CO2 SERPL-SCNC: 29 MMOL/L (ref 21–32)
COLOR UR: ABNORMAL
CREAT SERPL-MCNC: 0.8 MG/DL (ref 0.55–1.02)
CREAT UR-MCNC: 220 MG/DL
CRP SERPL HS-MCNC: 4.6 MG/L
DIFFERENTIAL METHOD BLD: ABNORMAL
EOSINOPHIL # BLD: 0.2 K/UL (ref 0–0.4)
EOSINOPHIL NFR BLD: 3 % (ref 0–7)
EPITH CASTS URNS QL MICRO: ABNORMAL /LPF
ERYTHROCYTE [DISTWIDTH] IN BLOOD BY AUTOMATED COUNT: 13.7 % (ref 11.5–14.5)
EST. AVERAGE GLUCOSE BLD GHB EST-MCNC: 186 MG/DL
GLOBULIN SER CALC-MCNC: 2.8 G/DL (ref 2–4)
GLUCOSE SERPL-MCNC: 135 MG/DL (ref 65–100)
GLUCOSE UR STRIP.AUTO-MCNC: NEGATIVE MG/DL
HBA1C MFR BLD: 8.1 % (ref 4–5.6)
HCT VFR BLD AUTO: 38.3 % (ref 35–47)
HDLC SERPL-MCNC: 72 MG/DL
HDLC SERPL: 2.3 (ref 0–5)
HGB BLD-MCNC: 12.2 G/DL (ref 11.5–16)
HGB UR QL STRIP: NEGATIVE
IMM GRANULOCYTES # BLD AUTO: 0 K/UL (ref 0–0.04)
IMM GRANULOCYTES NFR BLD AUTO: 1 % (ref 0–0.5)
KETONES UR QL STRIP.AUTO: NEGATIVE MG/DL
LDLC SERPL CALC-MCNC: 78.2 MG/DL (ref 0–100)
LEUKOCYTE ESTERASE UR QL STRIP.AUTO: ABNORMAL
LYMPHOCYTES # BLD: 2.5 K/UL (ref 0.8–3.5)
LYMPHOCYTES NFR BLD: 39 % (ref 12–49)
MCH RBC QN AUTO: 27.5 PG (ref 26–34)
MCHC RBC AUTO-ENTMCNC: 31.9 G/DL (ref 30–36.5)
MCV RBC AUTO: 86.5 FL (ref 80–99)
MICROALBUMIN UR-MCNC: 2.37 MG/DL
MICROALBUMIN/CREAT UR-RTO: 11 MG/G (ref 0–30)
MONOCYTES # BLD: 0.6 K/UL (ref 0–1)
MONOCYTES NFR BLD: 9 % (ref 5–13)
NEUTS SEG # BLD: 3.1 K/UL (ref 1.8–8)
NEUTS SEG NFR BLD: 47 % (ref 32–75)
NITRITE UR QL STRIP.AUTO: NEGATIVE
NRBC # BLD: 0 K/UL (ref 0–0.01)
NRBC BLD-RTO: 0 PER 100 WBC
PH UR STRIP: 6.5 (ref 5–8)
PLATELET # BLD AUTO: 249 K/UL (ref 150–400)
PMV BLD AUTO: 12.1 FL (ref 8.9–12.9)
POTASSIUM SERPL-SCNC: 4.2 MMOL/L (ref 3.5–5.1)
PROT SERPL-MCNC: 7.2 G/DL (ref 6.4–8.2)
PROT UR STRIP-MCNC: ABNORMAL MG/DL
RBC # BLD AUTO: 4.43 M/UL (ref 3.8–5.2)
RBC #/AREA URNS HPF: ABNORMAL /HPF (ref 0–5)
SODIUM SERPL-SCNC: 141 MMOL/L (ref 136–145)
SP GR UR REFRACTOMETRY: 1.03 (ref 1–1.03)
TRIGL SERPL-MCNC: 64 MG/DL
TSH SERPL DL<=0.05 MIU/L-ACNC: 1.32 UIU/ML (ref 0.36–3.74)
UROBILINOGEN UR QL STRIP.AUTO: 0.2 EU/DL (ref 0.2–1)
VLDLC SERPL CALC-MCNC: 12.8 MG/DL
WBC # BLD AUTO: 6.3 K/UL (ref 3.6–11)
WBC URNS QL MICRO: ABNORMAL /HPF (ref 0–4)

## 2024-05-17 PROCEDURE — 3077F SYST BP >= 140 MM HG: CPT | Performed by: INTERNAL MEDICINE

## 2024-05-17 PROCEDURE — 3079F DIAST BP 80-89 MM HG: CPT | Performed by: INTERNAL MEDICINE

## 2024-05-17 PROCEDURE — 3052F HG A1C>EQUAL 8.0%<EQUAL 9.0%: CPT | Performed by: INTERNAL MEDICINE

## 2024-05-17 PROCEDURE — 99214 OFFICE O/P EST MOD 30 MIN: CPT | Performed by: INTERNAL MEDICINE

## 2024-05-17 NOTE — PROGRESS NOTES
Chief Complaint   Patient presents with    Follow-up    Other     Patient states that she has been having pain in her wrist for about the last 4 weeks and would like her hearing checked.       \"Have you been to the ER, urgent care clinic since your last visit?  Hospitalized since your last visit?\"    NO    “Have you seen or consulted any other health care providers outside of [facility] since your last visit?”    NO     “Have you had a pap smear?”    YES - Where: [facility] Nurse/CMA to request most recent records if not in the chart    No cervical cancer screening on file             Click Here for Release of Records Request

## 2024-05-17 NOTE — PROGRESS NOTES
[facility]  [address]  [address]  [place] [address]  Phone:  [phone]  Fax: [phone]       Chief Complaint   Patient presents with    Follow-up    Other     Patient states that she has been having pain in her wrist for about the last 4 weeks and would like her hearing checked.   .      SUBJECTIVE:    Jennifer Ovalle is a 59 y.o. female  Dictation on: 05/17/2024 11:27 AM by: ELMER BRUMFIELD [[id number]]          Current Outpatient Medications   Medication Sig Dispense Refill    PARoxetine (PAXIL) 20 MG tablet TAKE 1 TABLET BY MOUTH EVERY DAY AT NIGHT 30 tablet 11    albuterol sulfate HFA (PROVENTIL;VENTOLIN;PROAIR) 108 (90 Base) MCG/ACT inhaler TAKE 2 PUFFS BY MOUTH EVERY 4 HOURS AS NEEDED FOR WHEEZE 18 g 5    WIXELA INHUB 100-50 MCG/ACT AEPB diskus inhaler TAKE 1 PUFF BY MOUTH TWICE A DAY *RINSE MOUTH AFTER USE* 60 each 11    albuterol (PROVENTIL) (2.5 MG/3ML) 0.083% nebulizer solution 3 ML BY NEBULIZATION ROUTE EVERY FOUR (4) HOURS AS NEEDED FOR WHEEZING. 75 mL 11    loratadine (CLARITIN) 10 MG tablet Take 1 tablet by mouth daily as needed      loratadine-pseudoephedrine (CLARITIN-D 24HR)  MG per extended release tablet Take 1 tab at 7am and 3pm      naproxen (NAPROSYN) 500 MG tablet Take 1 tablet by mouth 2 times daily (with meals)      valACYclovir (VALTREX) 1 g tablet Take 0.5 tablets by mouth daily      metFORMIN (GLUCOPHAGE-XR) 500 MG extended release tablet TAKE 1 TAB BY MOUTH DAILY (WITH DINNER). 90 tablet 3     No current facility-administered medications for this visit.     Past Medical History:   Diagnosis Date    Arthritis     Asthma      Past Surgical History:   Procedure Laterality Date    CHOLECYSTECTOMY      COLONOSCOPY N/A 5/2/2023    COLONOSCOPY performed by Tong Solorio MD at [facility] MAIN OR    COLONOSCOPY  4-    tubulovillous adenoma---Dr.Christine Romero    GI      GYN      s/p myomectomy, REECE and BSO     Allergies   Allergen

## 2024-05-18 VITALS
HEART RATE: 83 BPM | OXYGEN SATURATION: 98 % | SYSTOLIC BLOOD PRESSURE: 140 MMHG | HEIGHT: 64 IN | TEMPERATURE: 98.2 F | BODY MASS INDEX: 49.46 KG/M2 | DIASTOLIC BLOOD PRESSURE: 90 MMHG | RESPIRATION RATE: 18 BRPM | WEIGHT: 289.7 LBS

## 2024-05-18 NOTE — PROGRESS NOTES
Comes in for return visit, stating that she has done well. She realizes that she needs to lose weight, which is the case.  She is morbidly obese currently.    Her asthma has been doing reasonably well. She has not had a URI for the last several months.    She has a past history of primary hypertension, dyslipidemia, as well as diabetes mellitus.  Additionally, she has noted intermittent orthostatic swelling of her lower extremities, as well as a slight reduction in her hearing acuity, which appears to be bilateral.

## 2024-05-19 LAB — APO B SERPL-MCNC: 76 MG/DL

## 2024-05-27 DIAGNOSIS — N30.00 ACUTE CYSTITIS WITHOUT HEMATURIA: Primary | ICD-10-CM

## 2024-05-27 RX ORDER — CEFUROXIME AXETIL 250 MG/1
250 TABLET ORAL 2 TIMES DAILY
Qty: 14 TABLET | Refills: 0 | Status: SHIPPED | OUTPATIENT
Start: 2024-05-27 | End: 2024-06-03

## 2024-05-31 ENCOUNTER — TELEPHONE (OUTPATIENT)
Facility: CLINIC | Age: 59
End: 2024-05-31

## 2024-05-31 NOTE — TELEPHONE ENCOUNTER
----- Message from Alessandro Armijo MD sent at 5/27/2024  1:07 PM EDT -----  Diabetic control can be improved with increasing metformin and the addition of a GLP-1 which will help significantly with weight loss  Antibiotics have been called in for your UTI.

## 2024-06-01 RX ORDER — METFORMIN HYDROCHLORIDE 500 MG/1
TABLET, EXTENDED RELEASE ORAL
Qty: 120 TABLET | Refills: 11 | Status: SHIPPED | OUTPATIENT
Start: 2024-06-01

## 2024-08-05 RX ORDER — PAROXETINE HYDROCHLORIDE 20 MG/1
20 TABLET, FILM COATED ORAL NIGHTLY
Qty: 90 TABLET | Refills: 3 | Status: SHIPPED | OUTPATIENT
Start: 2024-08-05

## 2024-09-16 DIAGNOSIS — J45.20 MILD INTERMITTENT ASTHMA, UNCOMPLICATED: ICD-10-CM

## 2024-09-17 ENCOUNTER — OFFICE VISIT (OUTPATIENT)
Facility: CLINIC | Age: 59
End: 2024-09-17
Payer: COMMERCIAL

## 2024-09-17 VITALS
HEIGHT: 64 IN | RESPIRATION RATE: 16 BRPM | SYSTOLIC BLOOD PRESSURE: 136 MMHG | OXYGEN SATURATION: 98 % | HEART RATE: 72 BPM | DIASTOLIC BLOOD PRESSURE: 79 MMHG | TEMPERATURE: 98.3 F | BODY MASS INDEX: 48.93 KG/M2 | WEIGHT: 286.6 LBS

## 2024-09-17 DIAGNOSIS — E66.01 MORBID OBESITY WITH BMI OF 45.0-49.9, ADULT: ICD-10-CM

## 2024-09-17 DIAGNOSIS — M17.11 PRIMARY OSTEOARTHRITIS OF RIGHT KNEE: ICD-10-CM

## 2024-09-17 DIAGNOSIS — R53.81 PHYSICAL DECONDITIONING: ICD-10-CM

## 2024-09-17 DIAGNOSIS — I87.2 VENOUS INSUFFICIENCY: ICD-10-CM

## 2024-09-17 DIAGNOSIS — E11.9 CONTROLLED TYPE 2 DIABETES MELLITUS WITHOUT COMPLICATION, WITHOUT LONG-TERM CURRENT USE OF INSULIN (HCC): Primary | ICD-10-CM

## 2024-09-17 DIAGNOSIS — E78.5 DYSLIPIDEMIA: ICD-10-CM

## 2024-09-17 DIAGNOSIS — J45.20 MILD INTERMITTENT REACTIVE AIRWAY DISEASE WITHOUT COMPLICATION: ICD-10-CM

## 2024-09-17 PROCEDURE — 99214 OFFICE O/P EST MOD 30 MIN: CPT | Performed by: INTERNAL MEDICINE

## 2024-09-17 PROCEDURE — 3051F HG A1C>EQUAL 7.0%<8.0%: CPT | Performed by: INTERNAL MEDICINE

## 2024-09-17 RX ORDER — FLUTICASONE PROPIONATE AND SALMETEROL 100; 50 UG/1; UG/1
POWDER RESPIRATORY (INHALATION)
Qty: 60 EACH | Refills: 11 | Status: SHIPPED | OUTPATIENT
Start: 2024-09-17

## 2024-09-17 SDOH — ECONOMIC STABILITY: FOOD INSECURITY: WITHIN THE PAST 12 MONTHS, THE FOOD YOU BOUGHT JUST DIDN'T LAST AND YOU DIDN'T HAVE MONEY TO GET MORE.: NEVER TRUE

## 2024-09-17 SDOH — ECONOMIC STABILITY: FOOD INSECURITY: WITHIN THE PAST 12 MONTHS, YOU WORRIED THAT YOUR FOOD WOULD RUN OUT BEFORE YOU GOT MONEY TO BUY MORE.: NEVER TRUE

## 2024-09-17 SDOH — ECONOMIC STABILITY: INCOME INSECURITY: HOW HARD IS IT FOR YOU TO PAY FOR THE VERY BASICS LIKE FOOD, HOUSING, MEDICAL CARE, AND HEATING?: NOT HARD AT ALL

## 2024-09-17 ASSESSMENT — PATIENT HEALTH QUESTIONNAIRE - PHQ9
7. TROUBLE CONCENTRATING ON THINGS, SUCH AS READING THE NEWSPAPER OR WATCHING TELEVISION: NOT AT ALL
6. FEELING BAD ABOUT YOURSELF - OR THAT YOU ARE A FAILURE OR HAVE LET YOURSELF OR YOUR FAMILY DOWN: NOT AT ALL
2. FEELING DOWN, DEPRESSED OR HOPELESS: NOT AT ALL
SUM OF ALL RESPONSES TO PHQ9 QUESTIONS 1 & 2: 0
10. IF YOU CHECKED OFF ANY PROBLEMS, HOW DIFFICULT HAVE THESE PROBLEMS MADE IT FOR YOU TO DO YOUR WORK, TAKE CARE OF THINGS AT HOME, OR GET ALONG WITH OTHER PEOPLE: NOT DIFFICULT AT ALL
5. POOR APPETITE OR OVEREATING: NOT AT ALL
SUM OF ALL RESPONSES TO PHQ QUESTIONS 1-9: 0
4. FEELING TIRED OR HAVING LITTLE ENERGY: NOT AT ALL
8. MOVING OR SPEAKING SO SLOWLY THAT OTHER PEOPLE COULD HAVE NOTICED. OR THE OPPOSITE, BEING SO FIGETY OR RESTLESS THAT YOU HAVE BEEN MOVING AROUND A LOT MORE THAN USUAL: NOT AT ALL
SUM OF ALL RESPONSES TO PHQ QUESTIONS 1-9: 0
3. TROUBLE FALLING OR STAYING ASLEEP: NOT AT ALL
SUM OF ALL RESPONSES TO PHQ QUESTIONS 1-9: 0
9. THOUGHTS THAT YOU WOULD BE BETTER OFF DEAD, OR OF HURTING YOURSELF: NOT AT ALL
SUM OF ALL RESPONSES TO PHQ QUESTIONS 1-9: 0
1. LITTLE INTEREST OR PLEASURE IN DOING THINGS: NOT AT ALL

## 2024-09-17 ASSESSMENT — ANXIETY QUESTIONNAIRES
7. FEELING AFRAID AS IF SOMETHING AWFUL MIGHT HAPPEN: NOT AT ALL
2. NOT BEING ABLE TO STOP OR CONTROL WORRYING: NOT AT ALL
IF YOU CHECKED OFF ANY PROBLEMS ON THIS QUESTIONNAIRE, HOW DIFFICULT HAVE THESE PROBLEMS MADE IT FOR YOU TO DO YOUR WORK, TAKE CARE OF THINGS AT HOME, OR GET ALONG WITH OTHER PEOPLE: NOT DIFFICULT AT ALL
6. BECOMING EASILY ANNOYED OR IRRITABLE: NOT AT ALL
4. TROUBLE RELAXING: NOT AT ALL
1. FEELING NERVOUS, ANXIOUS, OR ON EDGE: NOT AT ALL
GAD7 TOTAL SCORE: 0
3. WORRYING TOO MUCH ABOUT DIFFERENT THINGS: NOT AT ALL
5. BEING SO RESTLESS THAT IT IS HARD TO SIT STILL: NOT AT ALL

## 2024-09-18 LAB
EST. AVERAGE GLUCOSE BLD GHB EST-MCNC: 169 MG/DL
HBA1C MFR BLD: 7.5 % (ref 4–5.6)

## 2024-09-19 LAB — APO B SERPL-MCNC: 67 MG/DL

## 2025-01-02 ENCOUNTER — OFFICE VISIT (OUTPATIENT)
Facility: CLINIC | Age: 60
End: 2025-01-02
Payer: COMMERCIAL

## 2025-01-02 VITALS
SYSTOLIC BLOOD PRESSURE: 127 MMHG | TEMPERATURE: 98.2 F | DIASTOLIC BLOOD PRESSURE: 78 MMHG | WEIGHT: 276 LBS | BODY MASS INDEX: 47.12 KG/M2 | OXYGEN SATURATION: 100 % | RESPIRATION RATE: 16 BRPM | HEART RATE: 56 BPM | HEIGHT: 64 IN

## 2025-01-02 DIAGNOSIS — J45.20 MILD INTERMITTENT ASTHMA, UNCOMPLICATED: ICD-10-CM

## 2025-01-02 DIAGNOSIS — E78.5 DYSLIPIDEMIA: ICD-10-CM

## 2025-01-02 DIAGNOSIS — N39.0 URINARY TRACT INFECTION WITHOUT HEMATURIA, SITE UNSPECIFIED: ICD-10-CM

## 2025-01-02 DIAGNOSIS — E11.9 CONTROLLED TYPE 2 DIABETES MELLITUS WITHOUT COMPLICATION, WITHOUT LONG-TERM CURRENT USE OF INSULIN (HCC): Primary | ICD-10-CM

## 2025-01-02 LAB
APPEARANCE UR: ABNORMAL
BACTERIA URNS QL MICRO: ABNORMAL /HPF
BILIRUB UR QL: NEGATIVE
CAOX CRY URNS QL MICRO: ABNORMAL
COLOR UR: ABNORMAL
EPITH CASTS URNS QL MICRO: ABNORMAL /LPF
EST. AVERAGE GLUCOSE BLD GHB EST-MCNC: 163 MG/DL
GLUCOSE UR STRIP.AUTO-MCNC: NEGATIVE MG/DL
HBA1C MFR BLD: 7.3 % (ref 4–5.6)
HGB UR QL STRIP: NEGATIVE
KETONES UR QL STRIP.AUTO: NEGATIVE MG/DL
LEUKOCYTE ESTERASE UR QL STRIP.AUTO: ABNORMAL
NITRITE UR QL STRIP.AUTO: NEGATIVE
PH UR STRIP: 5 (ref 5–8)
PROT UR STRIP-MCNC: NEGATIVE MG/DL
RBC #/AREA URNS HPF: ABNORMAL /HPF (ref 0–5)
SP GR UR REFRACTOMETRY: 1.02 (ref 1–1.03)
UROBILINOGEN UR QL STRIP.AUTO: 0.2 EU/DL (ref 0.2–1)
WBC URNS QL MICRO: ABNORMAL /HPF (ref 0–4)

## 2025-01-02 PROCEDURE — 3051F HG A1C>EQUAL 7.0%<8.0%: CPT | Performed by: INTERNAL MEDICINE

## 2025-01-02 PROCEDURE — 99214 OFFICE O/P EST MOD 30 MIN: CPT | Performed by: INTERNAL MEDICINE

## 2025-01-02 RX ORDER — ALBUTEROL SULFATE 0.83 MG/ML
2.5 SOLUTION RESPIRATORY (INHALATION) EVERY 4 HOURS PRN
Qty: 75 ML | Refills: 11 | Status: SHIPPED | OUTPATIENT
Start: 2025-01-02

## 2025-01-02 SDOH — ECONOMIC STABILITY: FOOD INSECURITY: WITHIN THE PAST 12 MONTHS, YOU WORRIED THAT YOUR FOOD WOULD RUN OUT BEFORE YOU GOT MONEY TO BUY MORE.: NEVER TRUE

## 2025-01-02 SDOH — ECONOMIC STABILITY: FOOD INSECURITY: WITHIN THE PAST 12 MONTHS, THE FOOD YOU BOUGHT JUST DIDN'T LAST AND YOU DIDN'T HAVE MONEY TO GET MORE.: NEVER TRUE

## 2025-01-02 SDOH — ECONOMIC STABILITY: INCOME INSECURITY: HOW HARD IS IT FOR YOU TO PAY FOR THE VERY BASICS LIKE FOOD, HOUSING, MEDICAL CARE, AND HEATING?: NOT HARD AT ALL

## 2025-01-02 ASSESSMENT — PATIENT HEALTH QUESTIONNAIRE - PHQ9
9. THOUGHTS THAT YOU WOULD BE BETTER OFF DEAD, OR OF HURTING YOURSELF: NOT AT ALL
SUM OF ALL RESPONSES TO PHQ QUESTIONS 1-9: 0
6. FEELING BAD ABOUT YOURSELF - OR THAT YOU ARE A FAILURE OR HAVE LET YOURSELF OR YOUR FAMILY DOWN: NOT AT ALL
10. IF YOU CHECKED OFF ANY PROBLEMS, HOW DIFFICULT HAVE THESE PROBLEMS MADE IT FOR YOU TO DO YOUR WORK, TAKE CARE OF THINGS AT HOME, OR GET ALONG WITH OTHER PEOPLE: NOT DIFFICULT AT ALL
SUM OF ALL RESPONSES TO PHQ QUESTIONS 1-9: 0
7. TROUBLE CONCENTRATING ON THINGS, SUCH AS READING THE NEWSPAPER OR WATCHING TELEVISION: NOT AT ALL
3. TROUBLE FALLING OR STAYING ASLEEP: NOT AT ALL
SUM OF ALL RESPONSES TO PHQ QUESTIONS 1-9: 0
SUM OF ALL RESPONSES TO PHQ QUESTIONS 1-9: 0
4. FEELING TIRED OR HAVING LITTLE ENERGY: NOT AT ALL
5. POOR APPETITE OR OVEREATING: NOT AT ALL
SUM OF ALL RESPONSES TO PHQ9 QUESTIONS 1 & 2: 0
8. MOVING OR SPEAKING SO SLOWLY THAT OTHER PEOPLE COULD HAVE NOTICED. OR THE OPPOSITE, BEING SO FIGETY OR RESTLESS THAT YOU HAVE BEEN MOVING AROUND A LOT MORE THAN USUAL: NOT AT ALL
2. FEELING DOWN, DEPRESSED OR HOPELESS: NOT AT ALL
1. LITTLE INTEREST OR PLEASURE IN DOING THINGS: NOT AT ALL

## 2025-01-02 ASSESSMENT — ANXIETY QUESTIONNAIRES
3. WORRYING TOO MUCH ABOUT DIFFERENT THINGS: NOT AT ALL
6. BECOMING EASILY ANNOYED OR IRRITABLE: NOT AT ALL
7. FEELING AFRAID AS IF SOMETHING AWFUL MIGHT HAPPEN: NOT AT ALL
2. NOT BEING ABLE TO STOP OR CONTROL WORRYING: NOT AT ALL
5. BEING SO RESTLESS THAT IT IS HARD TO SIT STILL: NOT AT ALL
IF YOU CHECKED OFF ANY PROBLEMS ON THIS QUESTIONNAIRE, HOW DIFFICULT HAVE THESE PROBLEMS MADE IT FOR YOU TO DO YOUR WORK, TAKE CARE OF THINGS AT HOME, OR GET ALONG WITH OTHER PEOPLE: NOT DIFFICULT AT ALL
GAD7 TOTAL SCORE: 0
4. TROUBLE RELAXING: NOT AT ALL
1. FEELING NERVOUS, ANXIOUS, OR ON EDGE: NOT AT ALL

## 2025-01-02 NOTE — PROGRESS NOTES
Chief Complaint   Patient presents with    Diabetes     Patient states \" she is present for a follow-up.\"    \"Have you been to the ER, urgent care clinic since your last visit?  Hospitalized since your last visit?\"    NO    “Have you seen or consulted any other health care providers outside our system since your last visit?”    NO     “Have you had a pap smear?”    NO    No cervical cancer screening on file       “Have you had a diabetic eye exam?”    NO     No diabetic eye exam on file

## 2025-01-02 NOTE — PROGRESS NOTES
[facility]  [address]   [address]  [place] [facility] [address]  Phone:  [phone]  Fax: [phone]       Chief Complaint   Patient presents with    Diabetes   .      SUBJECTIVE:      History of Present Illness  The patient is a 59-year-old female who presents for evaluation of diabetes, asthma, and recurrent urinary tract infections.    She has expressed a desire to discontinue metformin and is reluctant to initiate insulin therapy. Her primary objective is to reduce her A1c levels through weight loss, with a target weight of under 200 pounds.    She reports no recent exacerbations of her asthma. She requires a new prescription for her albuterol inhaler and confirms daily use of Wixela.    She is not currently on any antihypertensive or lipid-lowering medications. She underwent a colonoscopy in 2024 and acknowledges the need for mammography, ophthalmological examination, and Pap smear, all of which were not performed in 2023. She experiences mild discomfort in her knees and hips but does not consider it severe enough to warrant complaint. Her mobility remains unaffected.    SOCIAL HISTORY  She continues to work at the same job.    FAMILY HISTORY  She has 2 brothers and 4 or 5 sisters.    MEDICATIONS  Current: metformin, albuterol, Wixela         Current Outpatient Medications   Medication Sig Dispense Refill    albuterol (PROVENTIL) (2.5 MG/3ML) 0.083% nebulizer solution Take 3 mLs by nebulization every 4 hours as needed for Wheezing 75 mL 11    WIXELA INHUB 100-50 MCG/ACT AEPB diskus inhaler TAKE 1 PUFF BY MOUTH TWICE A DAY *RINSE MOUTH AFTER USE* 60 each 11    PARoxetine (PAXIL) 20 MG tablet TAKE 1 TABLET BY MOUTH EVERY DAY AT NIGHT 90 tablet 3    metFORMIN (GLUCOPHAGE-XR) 500 MG extended release tablet TAKE 2  TAB BY MOUTH WITH BREAKFAST AND 2 TABLETS WITH DINNER 120 tablet 11    albuterol sulfate HFA (PROVENTIL;VENTOLIN;PROAIR) 108 (90 Base) MCG/ACT inhaler TAKE 2 PUFFS BY

## 2025-01-03 LAB — APO B SERPL-MCNC: 84 MG/DL

## 2025-03-03 ENCOUNTER — OFFICE VISIT (OUTPATIENT)
Facility: CLINIC | Age: 60
End: 2025-03-03
Payer: COMMERCIAL

## 2025-03-03 VITALS
RESPIRATION RATE: 16 BRPM | BODY MASS INDEX: 46.85 KG/M2 | DIASTOLIC BLOOD PRESSURE: 74 MMHG | WEIGHT: 274.4 LBS | SYSTOLIC BLOOD PRESSURE: 122 MMHG | TEMPERATURE: 98 F | HEART RATE: 61 BPM | HEIGHT: 64 IN

## 2025-03-03 DIAGNOSIS — J45.20 MILD INTERMITTENT REACTIVE AIRWAY DISEASE WITHOUT COMPLICATION: ICD-10-CM

## 2025-03-03 DIAGNOSIS — R53.81 PHYSICAL DECONDITIONING: ICD-10-CM

## 2025-03-03 DIAGNOSIS — E11.9 CONTROLLED TYPE 2 DIABETES MELLITUS WITHOUT COMPLICATION, WITHOUT LONG-TERM CURRENT USE OF INSULIN (HCC): Primary | ICD-10-CM

## 2025-03-03 DIAGNOSIS — M54.16 LUMBAR RADICULOPATHY: ICD-10-CM

## 2025-03-03 DIAGNOSIS — I87.2 VENOUS INSUFFICIENCY: ICD-10-CM

## 2025-03-03 DIAGNOSIS — E66.01 MORBID OBESITY WITH BMI OF 45.0-49.9, ADULT: ICD-10-CM

## 2025-03-03 DIAGNOSIS — F32.9 REACTIVE DEPRESSION: ICD-10-CM

## 2025-03-03 PROCEDURE — 3051F HG A1C>EQUAL 7.0%<8.0%: CPT | Performed by: INTERNAL MEDICINE

## 2025-03-03 PROCEDURE — 99214 OFFICE O/P EST MOD 30 MIN: CPT | Performed by: INTERNAL MEDICINE

## 2025-03-03 SDOH — ECONOMIC STABILITY: FOOD INSECURITY: WITHIN THE PAST 12 MONTHS, THE FOOD YOU BOUGHT JUST DIDN'T LAST AND YOU DIDN'T HAVE MONEY TO GET MORE.: NEVER TRUE

## 2025-03-03 SDOH — ECONOMIC STABILITY: FOOD INSECURITY: WITHIN THE PAST 12 MONTHS, YOU WORRIED THAT YOUR FOOD WOULD RUN OUT BEFORE YOU GOT MONEY TO BUY MORE.: NEVER TRUE

## 2025-03-03 ASSESSMENT — PATIENT HEALTH QUESTIONNAIRE - PHQ9
SUM OF ALL RESPONSES TO PHQ QUESTIONS 1-9: 0
9. THOUGHTS THAT YOU WOULD BE BETTER OFF DEAD, OR OF HURTING YOURSELF: NOT AT ALL
8. MOVING OR SPEAKING SO SLOWLY THAT OTHER PEOPLE COULD HAVE NOTICED. OR THE OPPOSITE, BEING SO FIGETY OR RESTLESS THAT YOU HAVE BEEN MOVING AROUND A LOT MORE THAN USUAL: NOT AT ALL
7. TROUBLE CONCENTRATING ON THINGS, SUCH AS READING THE NEWSPAPER OR WATCHING TELEVISION: NOT AT ALL
SUM OF ALL RESPONSES TO PHQ QUESTIONS 1-9: 0
5. POOR APPETITE OR OVEREATING: NOT AT ALL
4. FEELING TIRED OR HAVING LITTLE ENERGY: NOT AT ALL
10. IF YOU CHECKED OFF ANY PROBLEMS, HOW DIFFICULT HAVE THESE PROBLEMS MADE IT FOR YOU TO DO YOUR WORK, TAKE CARE OF THINGS AT HOME, OR GET ALONG WITH OTHER PEOPLE: NOT DIFFICULT AT ALL
1. LITTLE INTEREST OR PLEASURE IN DOING THINGS: NOT AT ALL
3. TROUBLE FALLING OR STAYING ASLEEP: NOT AT ALL
SUM OF ALL RESPONSES TO PHQ QUESTIONS 1-9: 0
2. FEELING DOWN, DEPRESSED OR HOPELESS: NOT AT ALL
6. FEELING BAD ABOUT YOURSELF - OR THAT YOU ARE A FAILURE OR HAVE LET YOURSELF OR YOUR FAMILY DOWN: NOT AT ALL
SUM OF ALL RESPONSES TO PHQ QUESTIONS 1-9: 0

## 2025-03-03 ASSESSMENT — ANXIETY QUESTIONNAIRES
6. BECOMING EASILY ANNOYED OR IRRITABLE: NOT AT ALL
7. FEELING AFRAID AS IF SOMETHING AWFUL MIGHT HAPPEN: NOT AT ALL
1. FEELING NERVOUS, ANXIOUS, OR ON EDGE: NOT AT ALL
IF YOU CHECKED OFF ANY PROBLEMS ON THIS QUESTIONNAIRE, HOW DIFFICULT HAVE THESE PROBLEMS MADE IT FOR YOU TO DO YOUR WORK, TAKE CARE OF THINGS AT HOME, OR GET ALONG WITH OTHER PEOPLE: NOT DIFFICULT AT ALL
3. WORRYING TOO MUCH ABOUT DIFFERENT THINGS: NOT AT ALL
4. TROUBLE RELAXING: NOT AT ALL
5. BEING SO RESTLESS THAT IT IS HARD TO SIT STILL: NOT AT ALL
2. NOT BEING ABLE TO STOP OR CONTROL WORRYING: NOT AT ALL
GAD7 TOTAL SCORE: 0

## 2025-03-03 NOTE — PROGRESS NOTES
[address]    PROGRESS NOTE             12/27/2021    9:04 AM    Name:   Mihir Thornton  MRN:     [id number]     Acct:      [de-identified]   Room:   [address] [place] [address] Day:  0  Admit Date:  12/23/2021  3:25 PM    PCP:  Len Bee MD  Code Status:  Full Code    Subjective:     C/C:   Chief Complaint   Patient presents with    Extremity Weakness     Interval History Status: improved. The patient was seen and examined at bedside no acute events overnight, telemetry health is in the room, psychiatry had not seen the patient yet. Patient is still weak, right side is feeling better today. Informed the patient that he might need rehabilitation and to be discharged to [facility]. Vital signs stable overnight, lab results were reviewed, CK trending down to 1287, LFTs trending down, CBC within normal limits. Fluids were decreased to 100 mL/H. Myopathy and autoantibodies work-up still pending. Might require outpatient follow-up. PT OT evaluation. Patient requires rehab, awaiting placement. Brief History:     The patient is a 51 y. o.  Non- / non  male who presents withExtremity Weakness  Past medical history significant for HTN, HDL, obesity, schizophrenia, transferred from observation unit.  He is a poor historian, complaining of fatigue and bilateral upper and lower extremity weakness and falling down.  He states the weakness is more pronounced on the left side that led him to fall down 1 day prior to presentation. [facility] states that the weakness was there for at least 2 weeks.  Denies any numbness or tingling, headache, dizziness, loss of consciousness, abnormal movement.  Denies any visual disturbances.  Denies any fever, chills.  He feels body overall is in pain.  No chest pain, shortness of breath. UA was consistent with UTI patient is on Keflex twice.  CT head was done did not show any acute intracranial abnormalities.    CK is trending down around 3800s, patient is agreeing for any Chief Complaint   Patient presents with    Diabetes     Patient states \" she is having some right leg pain.\"    \"Have you been to the ER, urgent care clinic since your last visit?  Hospitalized since your last visit?\"    NO    “Have you seen or consulted any other health care providers outside our system since your last visit?”    NO     “Have you had a pap smear?”    NO    No cervical cancer screening on file       “Have you had a diabetic eye exam?”    NO     No diabetic eye exam on file           imaging if required. Continue IV fluids 150 mL/H normal saline  CK: 1200s    Review of Systems:     Review of Systems   Constitutional: Positive for activity change (weakness). Negative for appetite change, chills and fever. HENT: Positive for postnasal drip. Negative for congestion, ear pain, sore throat and tinnitus. Eyes: Negative for photophobia and visual disturbance. Respiratory: Negative for cough and shortness of breath. Cardiovascular: Negative for chest pain, palpitations and leg swelling. Gastrointestinal: Negative for abdominal distention and abdominal pain. Genitourinary: Negative for difficulty urinating. Musculoskeletal: Positive for myalgias, neck pain and neck stiffness. Neurological: Negative for dizziness, light-headedness and headaches. Psychiatric/Behavioral: Negative for agitation. The patient is not nervous/anxious. Medications: Allergies: Allergies   Allergen Reactions    Ace Inhibitors Swelling       Current Meds:   Scheduled Meds:    enoxaparin  30 mg SubCUTAneous BID    aspirin  81 mg Oral Daily    folic acid  1 mg Oral Daily    benztropine  1 mg Oral BID    buPROPion  150 mg Oral Daily    divalproex  1,000 mg Oral BID    metoprolol tartrate  50 mg Oral BID    risperiDONE  4 mg Oral BID    tamsulosin  0.4 mg Oral Daily    traZODone  100 mg Oral Nightly    sodium chloride flush  5-40 mL IntraVENous 2 times per day    cephALEXin  500 mg Oral 2 times per day     Continuous Infusions:    sodium chloride 100 mL/hr at 12/27/21 0758    sodium chloride       PRN Meds: Hydrocerin, sodium chloride flush, sodium chloride, acetaminophen, ondansetron **OR** ondansetron    Data:     Past Medical History:   has a past medical history of Hyperlipidemia, Hypertension, Obesity, and Schizophrenia ([facility] [address] Social History:   reports that he has never smoked.  He has never used smokeless tobacco. He reports that he does not drink alcohol and does not use drugs. Family History:   Family History   Family history unknown: Yes       Vitals:  /68   Pulse 94   Temp 97.5 °F (36.4 °C) (Oral)   Resp 18   Ht 6' (1.829 m)   Wt (!) 305 lb (138.3 kg)   SpO2 98%   BMI 41.37 kg/m²   Temp (24hrs), Av.4 °F (36.3 °C), Min:97.3 °F (36.3 °C), Max:97.5 °F (36.4 °C)    No results for input(s): POCGLU in the last 72 hours. I/O(24Hr): No intake or output data in the 24 hours ending 21 0904    Labs:    CBC with Differential:    Lab Results   Component Value Date    WBC 11.9 2021    RBC 4.25 2021    RBC 4.49 2012    HGB 11.9 2021    HCT 37.7 2021     2021     2012    MCV 88.7 2021    MCH 28.0 2021    MCHC 31.6 2021    RDW 20.9 2021    METASPCT 2 2017    LYMPHOPCT 9 2021    MONOPCT 12 2021    MYELOPCT 4 2017    BASOPCT 1 2021    MONOSABS 1.61 2021    LYMPHSABS 1.21 2021    EOSABS 0.13 2021    BASOSABS 0.13 2021    DIFFTYPE NOT REPORTED 2021     CMP:    Lab Results   Component Value Date     2021    K 3.8 2021     2021    CO2 20 2021    BUN 17 2021    CREATININE 0.63 2021    GFRAA >60 2021    LABGLOM >60 2021    GLUCOSE 123 2021    GLUCOSE 99 2011    PROT 6.1 2021    LABALBU 2.6 2021    LABALBU 3.9 2011    CALCIUM 8.2 2021    BILITOT 0.17 2021    ALKPHOS 77 2021    AST 81 2021    ALT 55 2021       Lab Results   Component Value Date/Time    SPECIAL R HAND 6 ML 2021 09:17 PM     Lab Results   Component Value Date/Time    CULTURE NO GROWTH 4 DAYS 2021 09:17 PM         Radiology:    XR ABDOMEN (KUB) (SINGLE AP VIEW)    Result Date: 2021  EXAMINATION: ONE SUPINE XRAY VIEW(S) OF THE ABDOMEN 2021 2:41 pm COMPARISON: None.  HISTORY: ORDERING SYSTEM PROVIDED HISTORY: Mri clearance TECHNOLOGIST PROVIDED HISTORY: Mri clearance FINDINGS: Large pannus. No obvious metallic foreign body. Large amount of retained stool and distended appearing bowel in the mid abdomen; no significant gas in the pelvis. Gas-filled stomach. No obvious free air. Prominent splenic shadow. Mild convex-left curvature and moderate DJD spine. No metallic foreign body identified in the abdomen or pelvis. Distended bowel loops mid abdomen with a large amount of retained stool. Correlate clinically. CT HEAD WO CONTRAST    Result Date: 12/23/2021  EXAMINATION: CT OF THE HEAD WITHOUT CONTRAST  12/23/2021 1:26 pm TECHNIQUE: CT of the head was performed without the administration of intravenous contrast. Dose modulation, iterative reconstruction, and/or weight based adjustment of the mA/kV was utilized to reduce the radiation dose to as low as reasonably achievable. COMPARISON: None. HISTORY: ORDERING SYSTEM PROVIDED HISTORY: ams, left sided weakness TECHNOLOGIST PROVIDED HISTORY: ams, left sided weakness Decision Support Exception - unselect if not a suspected or confirmed emergency medical condition->Emergency Medical Condition (MA) Reason for Exam: ams left sided weakness FINDINGS: BRAIN/VENTRICLES:  No acute loss of the gray-white matter differentiation is identified to suggest acute or subacute infarct. No masses or hemorrhages within the brain parenchyma are found. No evidence of midline shift. There is mild periventricular low-attenuation, compatible with chronic small vessel ischemic disease. Mild atrophy noted. The intracranial vasculature, including the dural venous sinuses, is within normal limits. ORBITS: No acute orbital abnormalities are identified. SINUSES: Mild mucosal thickening noted within the paranasal sinuses. Paranasal sinuses are otherwise clear. SOFT TISSUES/SKULL: The calvarium is intact. Extracranial soft tissues are unremarkable. No acute intracranial abnormality.      MRI CERVICAL SPINE WO CONTRAST    Result Date: 12/26/2021  EXAMINATION: MRI OF THE CERVICAL SPINE WITHOUT CONTRAST 12/26/2021 5:28 pm TECHNIQUE: Multiplanar multisequence MRI of the cervical spine was performed without the administration of intravenous contrast. COMPARISON: None. HISTORY: ORDERING SYSTEM PROVIDED HISTORY: lower extremity weakness r/o myelopathy TECHNOLOGIST PROVIDED HISTORY: lower extremity weakness r/o myelopathy Reason for Exam: lower extremity weakness r/o myelopathy FINDINGS: BONES/ALIGNMENT: There is normal alignment of the spine. The vertebral body heights are maintained. The bone marrow signal appears unremarkable. SPINAL CORD: No abnormal cord signal is seen. SOFT TISSUES: No paraspinal mass identified. C2-C3: There is no significant disc protrusion, spinal canal stenosis or neural foraminal narrowing. C3-C4: Subtle retrolisthesis. Mild bilateral facet arthropathy. Mild right neural foraminal narrowing C4-C5: Mild bilateral facet arthropathy. Mild right neural foraminal narrowing C5-C6: Subtle anterolisthesis. Mild bilateral facet arthropathy. Otherwise unremarkable C6-C7: Posterior uncovertebral hypertrophy. Mild bilateral facet arthropathy. Mild right neural foraminal narrowing C7-T1: Mild bilateral facet arthropathy. No canal or foraminal stenosis. No cord signal abnormality. No high-grade canal or foraminal stenosis. No nerve impingement. No significant posterior disc pathology. Mild degenerative changes of cervical spine as described RECOMMENDATIONS: Unavailable     XR CHEST PORTABLE    Result Date: 12/23/2021  EXAMINATION: ONE XRAY VIEW OF THE CHEST 12/23/2021 4:12 pm COMPARISON: None. HISTORY: ORDERING SYSTEM PROVIDED HISTORY: AMS TECHNOLOGIST PROVIDED HISTORY: AMS Reason for Exam: upr FINDINGS: A portable upright frontal view chest radiograph was obtained. The heart is enlarged. The mediastinal contour and pleural spaces are otherwise within normal limits.   The pulmonary vascular pattern is increased. There is no focal consolidation or pneumothorax. No acute thoracic osseous abnormality. Mild pulmonary vascular congestive change. Cardiomegaly. No significant pleural effusion. MRI BRAIN WO CONTRAST    Result Date: 12/26/2021  EXAMINATION: MRI OF THE BRAIN WITHOUT CONTRAST  12/26/2021 5:28 pm TECHNIQUE: Multiplanar multisequence MRI of the brain was performed without the administration of intravenous contrast. COMPARISON: None. HISTORY: ORDERING SYSTEM PROVIDED HISTORY: Generalized weakness, slightly worse on the left, history of falls TECHNOLOGIST PROVIDED HISTORY: Generalized weakness, slightly worse on the left, history of falls Decision Support Exception - unselect if not a suspected or confirmed emergency medical condition->Emergency Medical Condition (MA) Reason for Exam: Generalized weakness, slightly worse on the left, history of falls FINDINGS: INTRACRANIAL STRUCTURES/VENTRICLES: There is no acute infarct. No mass effect or midline shift. No evidence of an acute intracranial hemorrhage. The ventricles and sulci are normal in size and configuration. The sellar/suprasellar regions appear unremarkable. The normal signal voids within the major intracranial vessels appear maintained. Moderate volume loss predominantly affecting temporal and frontal lobes. ORBITS: The visualized portion of the orbits demonstrate no acute abnormality. SINUSES: The visualized paranasal sinuses and mastoid air cells demonstrate no acute abnormality. BONES/SOFT TISSUES: The bone marrow signal intensity appears normal. The soft tissues demonstrate no acute abnormality. No acute infarction, intracranial hemorrhage or mass lesion. Moderate volume loss predominantly affecting temporal and frontal lobes. The finding is much more than expected for patient age. RECOMMENDATIONS: Unavailable         Physical Examination:        Physical Exam  Constitutional:       General: He is not in acute distress.   HENT: Head: Normocephalic. Nose: Nose normal.      Mouth/Throat:      Mouth: Mucous membranes are dry. Pharynx: Oropharynx is clear. Eyes:      Extraocular Movements: Extraocular movements intact. Pupils: Pupils are equal, round, and reactive to light. Cardiovascular:      Rate and Rhythm: Normal rate and regular rhythm. Pulses: Normal pulses. Heart sounds: Normal heart sounds. Pulmonary:      Effort: Pulmonary effort is normal. No respiratory distress. Breath sounds: Normal breath sounds. Abdominal:      General: Abdomen is flat. Bowel sounds are normal. There is distension. Tenderness: There is no abdominal tenderness. There is no guarding. Musculoskeletal:      Cervical back: No edema, rigidity or tenderness. Right lower leg: No edema. Left lower leg: No edema. Comments: Left upper and lower extremity weakness      Skin:     General: Skin is warm and dry. Capillary Refill: Capillary refill takes less than 2 seconds. Comments: Thick skin, no hyperkeratosis of bilateral extremities upper and lower, scaly more pronounced and distally. Neurological:      Mental Status: He is oriented to person, place, and time. He is lethargic. Motor: Weakness present. Deep Tendon Reflexes: Reflexes normal.      Reflex Scores:       Bicep reflexes are 2+ on the right side and 2+ on the left side. Brachioradialis reflexes are 2+ on the right side and 2+ on the left side. Patellar reflexes are 2+ on the right side and 2+ on the left side.   Psychiatric:         Mood and Affect: Mood normal.           Assessment:        Primary Problem  Myopathy    Active Hospital Problems    Diagnosis Date Noted    Myopathy [G72.9] 12/24/2021    Acute cystitis with hematuria [N30.01]     Non-traumatic rhabdomyolysis [M62.82]     Generalized weakness [R53.1] 12/23/2021    Dyslipidemia [E78.5] 02/07/2020    Schizoaffective disorder ([facility] [address] [F25.9] 03/19/2016  HTN (hypertension) [I10] 05/24/2012       Plan:        Rhabdomyolysis  Creatinine kinase currently 3800s  IV fluids normal saline running at 150 ML/H  Creatinine 0.62  Avoid statins     Proximal muscle weakness  Elevated ESR and CRP  TSH normal  CK trending down today 1200s  Getting autoimmune antibodies to rule out dermatomyositis  Neurology on board  MRI  of the brain showed no acute normality, moderate volume loss of the temporal and frontal lobes.   Low folate, replacing folic acid  Avoid statins  Possible CT abdomen if autoimmune labs are positive to look for underlying malignancy   Outpatient PFTs     Schizophrenia  On risperidone  Benztropine  Divalproex  We will consult psychiatry     Hypertension   Lopressor 50 mg twice daily     DVT prophylaxis  Lovenox     PT OT evaluation    Dispo: Awaiting placement    Lazarus Hyatt MD  12/27/2021  9:04 AM

## 2025-03-11 NOTE — PROGRESS NOTES
[facility]  [facility]   [address]  [facility] [address]  Phone:  [phone]  Fax: [phone]       Chief Complaint   Patient presents with    Diabetes   .      SUBJECTIVE:      History of Present Illness  The patient presents for evaluation of right foot pain.    She reports experiencing pain in her right foot, which radiates upwards. This discomfort began abruptly last week while she was ambulating in her kitchen. She describes the sensation as if her foot had turned sideways, although she does not recall any specific twisting motion. She has not experienced any falls or trauma to the area. The pain is particularly bothersome at night when she is in a supine position and has recently started to interfere with her walking. This is her first encounter with such symptoms. The pain, which has remained consistent over the past week, intensifies upon standing and walking, affecting her entire leg. She notes that the pain was absent upon waking yesterday morning but believes she may have overexerted herself by running errands, which resulted in persistent aching throughout the night. She also mentions that certain sleeping positions can alleviate the pain. She has flat feet and typically replaces her footwear every 4 to 5 months. She speculates that the current pain may be due to overdue shoe replacement. She has been managing the pain with naproxen twice daily and Tylenol as needed.    She is a known diabetic and is currently on metformin. She reports that her A1c levels are decreasing. She is making efforts to reduce her weight.    She has a past history of obesity venous insufficiency reactive airway disease as well as depression.    Supplemental Information  She has a history of asthma and has been on medication for it all her life.    MEDICATIONS  Current: metformin, naproxen, acetaminophen         Current Outpatient Medications   Medication Sig Dispense Refill    Semaglutide

## 2025-04-18 DIAGNOSIS — J45.20 MILD INTERMITTENT ASTHMA WITHOUT COMPLICATION: ICD-10-CM

## 2025-04-19 RX ORDER — ALBUTEROL SULFATE 90 UG/1
INHALANT RESPIRATORY (INHALATION)
Qty: 18 G | Refills: 11 | Status: SHIPPED | OUTPATIENT
Start: 2025-04-19

## 2025-05-02 ENCOUNTER — OFFICE VISIT (OUTPATIENT)
Facility: CLINIC | Age: 60
End: 2025-05-02
Payer: COMMERCIAL

## 2025-05-02 VITALS
HEART RATE: 75 BPM | OXYGEN SATURATION: 98 % | HEIGHT: 64 IN | RESPIRATION RATE: 20 BRPM | BODY MASS INDEX: 46.52 KG/M2 | DIASTOLIC BLOOD PRESSURE: 70 MMHG | SYSTOLIC BLOOD PRESSURE: 120 MMHG | WEIGHT: 272.5 LBS

## 2025-05-02 DIAGNOSIS — J45.20 MILD INTERMITTENT REACTIVE AIRWAY DISEASE WITHOUT COMPLICATION: ICD-10-CM

## 2025-05-02 DIAGNOSIS — E66.01 MORBID OBESITY WITH BMI OF 45.0-49.9, ADULT (HCC): Primary | ICD-10-CM

## 2025-05-02 DIAGNOSIS — F32.9 REACTIVE DEPRESSION: ICD-10-CM

## 2025-05-02 DIAGNOSIS — E11.9 CONTROLLED TYPE 2 DIABETES MELLITUS WITHOUT COMPLICATION, WITHOUT LONG-TERM CURRENT USE OF INSULIN (HCC): ICD-10-CM

## 2025-05-02 PROCEDURE — 3051F HG A1C>EQUAL 7.0%<8.0%: CPT | Performed by: INTERNAL MEDICINE

## 2025-05-02 PROCEDURE — 99214 OFFICE O/P EST MOD 30 MIN: CPT | Performed by: INTERNAL MEDICINE

## 2025-05-02 NOTE — PROGRESS NOTES
Chief Complaint   Patient presents with    Diabetes    Leg Pain     Right leg pain     Have you been to the ER, urgent care clinic since your last visit?  Hospitalized since your last visit?   NO    Have you seen or consulted any other health care providers outside our system since your last visit?   NO     “Have you had a pap smear?”    NO    No cervical cancer screening on file       “Have you had a diabetic eye exam?”    NO     No diabetic eye exam on file

## 2025-05-03 NOTE — PROGRESS NOTES
[facility]  [facility]   [address]  [facility] [address]  Phone:  [phone]  Fax: [phone]       Chief Complaint   Patient presents with    Diabetes    Leg Pain     Right leg pain   .      SUBJECTIVE:      History of Present Illness  The patient presents for evaluation of lumbar radiculopathy, diabetes mellitus, and asthma.    Persistent discomfort in the right leg, previously diagnosed as a pinched nerve, is reported. The pain is localized to the lower part of the leg and intensifies upon prolonged standing. It also disrupts sleep, necessitating foot repositioning for relief. Mobility is compromised, limiting activities. Despite these challenges, slight improvement in the condition is noted. Interest in physical therapy as a potential treatment option is expressed. Pain management has been attempted with naproxen, taken once daily, but occasional forgetfulness in administration is reported. The pain is tolerable, and additional measures to expedite recovery are sought. An epidural injection is declined.    Adherence to a 30-day course of Rybelsus for diabetes management is reported. Concerns about potential side effects, including the risk of thyroid cancer, are expressed. The last dose was completed in early 04/2025.    Respiratory function is currently stable. Asthma exacerbations occur during ragweed pollen season. Symptoms are managed with Xopenex and albuterol inhalers.         Current Outpatient Medications   Medication Sig Dispense Refill    Semaglutide 7 MG TABS Take 7 mg by mouth daily 30 tablet 0    albuterol sulfate HFA (PROVENTIL;VENTOLIN;PROAIR) 108 (90 Base) MCG/ACT inhaler INHALE 2 PUFFS BY MOUTH EVERY 4 HOURS AS NEEDED FOR WHEEZE 18 g 11    albuterol (PROVENTIL) (2.5 MG/3ML) 0.083% nebulizer solution Take 3 mLs by nebulization every 4 hours as needed for Wheezing 75 mL 11    WIXELA INHUB 100-50 MCG/ACT AEPB diskus inhaler TAKE 1 PUFF BY MOUTH TWICE A DAY

## 2025-06-06 DIAGNOSIS — E11.9 CONTROLLED TYPE 2 DIABETES MELLITUS WITHOUT COMPLICATION, WITHOUT LONG-TERM CURRENT USE OF INSULIN (HCC): ICD-10-CM

## 2025-07-11 ENCOUNTER — OFFICE VISIT (OUTPATIENT)
Facility: CLINIC | Age: 60
End: 2025-07-11
Payer: COMMERCIAL

## 2025-07-11 VITALS
SYSTOLIC BLOOD PRESSURE: 136 MMHG | BODY MASS INDEX: 44.97 KG/M2 | WEIGHT: 263.4 LBS | DIASTOLIC BLOOD PRESSURE: 74 MMHG | TEMPERATURE: 98.2 F | HEART RATE: 65 BPM | HEIGHT: 64 IN | OXYGEN SATURATION: 100 % | RESPIRATION RATE: 18 BRPM

## 2025-07-11 DIAGNOSIS — J45.20 MILD INTERMITTENT REACTIVE AIRWAY DISEASE WITHOUT COMPLICATION: ICD-10-CM

## 2025-07-11 DIAGNOSIS — F32.9 REACTIVE DEPRESSION: ICD-10-CM

## 2025-07-11 DIAGNOSIS — E66.01 MORBID OBESITY WITH BMI OF 45.0-49.9, ADULT (HCC): ICD-10-CM

## 2025-07-11 DIAGNOSIS — E11.9 CONTROLLED TYPE 2 DIABETES MELLITUS WITHOUT COMPLICATION, WITHOUT LONG-TERM CURRENT USE OF INSULIN (HCC): Primary | ICD-10-CM

## 2025-07-11 PROCEDURE — 99214 OFFICE O/P EST MOD 30 MIN: CPT | Performed by: INTERNAL MEDICINE

## 2025-07-11 PROCEDURE — 3044F HG A1C LEVEL LT 7.0%: CPT | Performed by: INTERNAL MEDICINE

## 2025-07-11 NOTE — PROGRESS NOTES
Chief Complaint   Patient presents with    Follow-up    Diabetes     Patient here for follow up diabetes.      Have you been to the ER, urgent care clinic since your last visit?  Hospitalized since your last visit?   NO    Have you seen or consulted any other health care providers outside our system since your last visit?   NO     “Have you had a pap smear?”    NO    No cervical cancer screening on file       “Have you had a diabetic eye exam?”    YES - Where: [facility] Dr. Mily Lewis Nurse/JULIA to request most recent records if not in the chart     No diabetic eye exam on file

## 2025-07-13 LAB
EST. AVERAGE GLUCOSE BLD GHB EST-MCNC: 123 MG/DL
HBA1C MFR BLD: 5.9 % (ref 4–5.6)

## 2025-07-25 ENCOUNTER — RESULTS FOLLOW-UP (OUTPATIENT)
Facility: CLINIC | Age: 60
End: 2025-07-25

## 2025-08-26 RX ORDER — METFORMIN HYDROCHLORIDE 500 MG/1
TABLET, EXTENDED RELEASE ORAL
Qty: 360 TABLET | Refills: 3 | Status: SHIPPED | OUTPATIENT
Start: 2025-08-26

## (undated) DEVICE — ELECTRODE,RADIOTRANSLUCENT,FOAM,5PK: Brand: MEDLINE

## (undated) DEVICE — YANKAUER,TAPERED BULBOUS TIP,W/O VENT: Brand: MEDLINE

## (undated) DEVICE — FORCEPS BX L160CM DIA8MM GRSP DISECT CUP TIP NONLOCKING ROT

## (undated) DEVICE — TOWEL 4 PLY TISS 19X30 SUE WHT

## (undated) DEVICE — KIT,1200CC CANISTER,3/16"X6' TUBING: Brand: MEDLINE INDUSTRIES, INC.

## (undated) DEVICE — CONTAINER SPEC 20 ML LID NEUT BUFF FORMALIN 10 % POLYPR STS

## (undated) DEVICE — BLOCK BITE ENDOSCP AD 21 MM W/ DIL BLU LF DISP

## (undated) DEVICE — CANNULA CUSH AD W/ 14FT TBG